# Patient Record
Sex: MALE | Race: WHITE | NOT HISPANIC OR LATINO | Employment: FULL TIME | URBAN - METROPOLITAN AREA
[De-identification: names, ages, dates, MRNs, and addresses within clinical notes are randomized per-mention and may not be internally consistent; named-entity substitution may affect disease eponyms.]

---

## 2017-01-24 DIAGNOSIS — Z00.00 ROUTINE GENERAL MEDICAL EXAMINATION AT A HEALTH CARE FACILITY: Primary | ICD-10-CM

## 2017-01-26 ENCOUNTER — CLINICAL SUPPORT (OUTPATIENT)
Dept: INTERNAL MEDICINE | Facility: CLINIC | Age: 72
End: 2017-01-26
Payer: COMMERCIAL

## 2017-01-26 ENCOUNTER — CLINICAL SUPPORT (OUTPATIENT)
Dept: INFECTIOUS DISEASES | Facility: CLINIC | Age: 72
End: 2017-01-26
Payer: COMMERCIAL

## 2017-01-26 ENCOUNTER — OFFICE VISIT (OUTPATIENT)
Dept: PULMONOLOGY | Facility: CLINIC | Age: 72
End: 2017-01-26
Payer: COMMERCIAL

## 2017-01-26 ENCOUNTER — HOSPITAL ENCOUNTER (OUTPATIENT)
Dept: CARDIOLOGY | Facility: CLINIC | Age: 72
Discharge: HOME | End: 2017-01-26
Payer: COMMERCIAL

## 2017-01-26 VITALS
WEIGHT: 205 LBS | TEMPERATURE: 55 F | BODY MASS INDEX: 27.77 KG/M2 | DIASTOLIC BLOOD PRESSURE: 75 MMHG | HEIGHT: 72 IN | SYSTOLIC BLOOD PRESSURE: 136 MMHG

## 2017-01-26 DIAGNOSIS — Z00.00 ROUTINE GENERAL MEDICAL EXAMINATION AT A HEALTH CARE FACILITY: ICD-10-CM

## 2017-01-26 DIAGNOSIS — Z00.00 ANNUAL PHYSICAL EXAM: Primary | ICD-10-CM

## 2017-01-26 DIAGNOSIS — Z00.00 ROUTINE GENERAL MEDICAL EXAMINATION AT A HEALTH CARE FACILITY: Primary | ICD-10-CM

## 2017-01-26 LAB
ALBUMIN SERPL BCP-MCNC: 3.7 G/DL
ALP SERPL-CCNC: 51 U/L
ALT SERPL W/O P-5'-P-CCNC: 23 U/L
ANION GAP SERPL CALC-SCNC: 5 MMOL/L
AST SERPL-CCNC: 20 U/L
BILIRUB SERPL-MCNC: 1 MG/DL
BUN SERPL-MCNC: 12 MG/DL
CALCIUM SERPL-MCNC: 9.2 MG/DL
CHLORIDE SERPL-SCNC: 107 MMOL/L
CHOLEST/HDLC SERPL: 3.2 {RATIO}
CO2 SERPL-SCNC: 28 MMOL/L
COMPLEXED PSA SERPL-MCNC: 3.3 NG/ML
CREAT SERPL-MCNC: 1.1 MG/DL
ERYTHROCYTE [DISTWIDTH] IN BLOOD BY AUTOMATED COUNT: 13.1 %
EST. GFR  (AFRICAN AMERICAN): >60 ML/MIN/1.73 M^2
EST. GFR  (NON AFRICAN AMERICAN): >60 ML/MIN/1.73 M^2
ESTIMATED AVG GLUCOSE: 108 MG/DL
GLUCOSE SERPL-MCNC: 108 MG/DL
HBA1C MFR BLD HPLC: 5.4 %
HCT VFR BLD AUTO: 46.9 %
HDL/CHOLESTEROL RATIO: 31.1 %
HDLC SERPL-MCNC: 177 MG/DL
HDLC SERPL-MCNC: 55 MG/DL
HGB BLD-MCNC: 15.9 G/DL
LDLC SERPL CALC-MCNC: 106.2 MG/DL
MCH RBC QN AUTO: 31.4 PG
MCHC RBC AUTO-ENTMCNC: 33.9 %
MCV RBC AUTO: 93 FL
NONHDLC SERPL-MCNC: 122 MG/DL
PLATELET # BLD AUTO: 186 K/UL
PMV BLD AUTO: 9.9 FL
POTASSIUM SERPL-SCNC: 4.4 MMOL/L
PROT SERPL-MCNC: 6.5 G/DL
RBC # BLD AUTO: 5.06 M/UL
SODIUM SERPL-SCNC: 140 MMOL/L
TRIGL SERPL-MCNC: 79 MG/DL
TSH SERPL DL<=0.005 MIU/L-ACNC: 2.54 UIU/ML
WBC # BLD AUTO: 5.69 K/UL

## 2017-01-26 PROCEDURE — 93000 ELECTROCARDIOGRAM COMPLETE: CPT | Mod: S$GLB,,, | Performed by: INTERNAL MEDICINE

## 2017-01-26 PROCEDURE — 90471 IMMUNIZATION ADMIN: CPT | Mod: S$GLB,,, | Performed by: INTERNAL MEDICINE

## 2017-01-26 PROCEDURE — 99999 PR PBB SHADOW E&M-EST. PATIENT-LVL I: CPT | Mod: PBBFAC,,,

## 2017-01-26 PROCEDURE — 84443 ASSAY THYROID STIM HORMONE: CPT

## 2017-01-26 PROCEDURE — 99397 PER PM REEVAL EST PAT 65+ YR: CPT | Mod: S$GLB,,, | Performed by: INTERNAL MEDICINE

## 2017-01-26 PROCEDURE — 90715 TDAP VACCINE 7 YRS/> IM: CPT | Mod: S$GLB,,, | Performed by: INTERNAL MEDICINE

## 2017-01-26 PROCEDURE — 84153 ASSAY OF PSA TOTAL: CPT

## 2017-01-26 PROCEDURE — 80053 COMPREHEN METABOLIC PANEL: CPT

## 2017-01-26 PROCEDURE — 85027 COMPLETE CBC AUTOMATED: CPT

## 2017-01-26 PROCEDURE — 83036 HEMOGLOBIN GLYCOSYLATED A1C: CPT

## 2017-01-26 PROCEDURE — 80061 LIPID PANEL: CPT

## 2017-01-26 PROCEDURE — 99999 PR PBB SHADOW E&M-EST. PATIENT-LVL III: CPT | Mod: PBBFAC,,, | Performed by: INTERNAL MEDICINE

## 2017-01-26 NOTE — LETTER
January 26, 2017    Rip Anthony  [address]  [facility] [address]             [facility]  [address]  [facility] [address]  Phone: [phone] Dear Rip,    Thank you for allowing me to serve you and perform your Executive Health exam on 1/26/2017. This letter will serve as a brief summary of the physical findings and laboratory/studies performed and recommendations at this time.  Today's assessment is essentially normal. In Feb. 2013, you weighed 220 pounds, good job with the weight loss.     Enjoy the [employer] [place] in [place].         If you have any questions or concerns, please don't hesitate to call.    Sincerely,        Davide Parker MD
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
## 2017-05-04 ENCOUNTER — OFFICE VISIT (OUTPATIENT)
Dept: INTERNAL MEDICINE | Facility: CLINIC | Age: 72
End: 2017-05-04
Payer: COMMERCIAL

## 2017-05-04 ENCOUNTER — PATIENT MESSAGE (OUTPATIENT)
Dept: INTERNAL MEDICINE | Facility: CLINIC | Age: 72
End: 2017-05-04

## 2017-05-04 ENCOUNTER — TELEPHONE (OUTPATIENT)
Dept: INTERNAL MEDICINE | Facility: CLINIC | Age: 72
End: 2017-05-04

## 2017-05-04 ENCOUNTER — HOSPITAL ENCOUNTER (OUTPATIENT)
Dept: RADIOLOGY | Facility: HOSPITAL | Age: 72
Discharge: HOME | End: 2017-05-04
Attending: INTERNAL MEDICINE
Payer: COMMERCIAL

## 2017-05-04 VITALS
SYSTOLIC BLOOD PRESSURE: 130 MMHG | DIASTOLIC BLOOD PRESSURE: 75 MMHG | HEART RATE: 68 BPM | BODY MASS INDEX: 28.37 KG/M2 | WEIGHT: 214.06 LBS | HEIGHT: 73 IN

## 2017-05-04 DIAGNOSIS — S13.4XXA WHIPLASH INJURY, INITIAL ENCOUNTER: ICD-10-CM

## 2017-05-04 DIAGNOSIS — M54.6 PAIN IN THORACIC SPINE AT MULTIPLE SITES: ICD-10-CM

## 2017-05-04 DIAGNOSIS — E78.2 MIXED HYPERLIPIDEMIA: ICD-10-CM

## 2017-05-04 DIAGNOSIS — R07.89 CHEST WALL PAIN: Primary | ICD-10-CM

## 2017-05-04 DIAGNOSIS — R07.89 CHEST WALL PAIN: ICD-10-CM

## 2017-05-04 PROCEDURE — 93010 ELECTROCARDIOGRAM REPORT: CPT | Mod: S$GLB,,, | Performed by: INTERNAL MEDICINE

## 2017-05-04 PROCEDURE — 72040 X-RAY EXAM NECK SPINE 2-3 VW: CPT | Mod: TC

## 2017-05-04 PROCEDURE — 71020 XR CHEST PA AND LATERAL: CPT | Mod: 26,,, | Performed by: RADIOLOGY

## 2017-05-04 PROCEDURE — 71020 XR CHEST PA AND LATERAL: CPT | Mod: TC

## 2017-05-04 PROCEDURE — 93005 ELECTROCARDIOGRAM TRACING: CPT | Mod: S$GLB,,, | Performed by: INTERNAL MEDICINE

## 2017-05-04 PROCEDURE — 72070 X-RAY EXAM THORAC SPINE 2VWS: CPT | Mod: TC

## 2017-05-04 PROCEDURE — 71110 X-RAY EXAM RIBS BIL 3 VIEWS: CPT | Mod: TC

## 2017-05-04 PROCEDURE — 99214 OFFICE O/P EST MOD 30 MIN: CPT | Mod: 25 | Performed by: INTERNAL MEDICINE

## 2017-05-04 PROCEDURE — 72070 X-RAY EXAM THORAC SPINE 2VWS: CPT | Mod: 26,,, | Performed by: RADIOLOGY

## 2017-05-04 PROCEDURE — 72040 X-RAY EXAM NECK SPINE 2-3 VW: CPT | Mod: 26,,, | Performed by: RADIOLOGY

## 2017-05-04 PROCEDURE — 71110 X-RAY EXAM RIBS BIL 3 VIEWS: CPT | Mod: 26,,, | Performed by: RADIOLOGY

## 2017-05-04 PROCEDURE — 99204 OFFICE O/P NEW MOD 45 MIN: CPT | Mod: S$GLB,,, | Performed by: INTERNAL MEDICINE
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
## 2017-10-18 RX ORDER — ATORVASTATIN CALCIUM 20 MG/1
TABLET, FILM COATED ORAL
Qty: 30 TABLET | Refills: 12 | Status: SHIPPED | OUTPATIENT
Start: 2017-10-18 | End: 2018-12-10 | Stop reason: SDUPTHER

## 2017-12-06 DIAGNOSIS — Z00.00 ROUTINE GENERAL MEDICAL EXAMINATION AT A HEALTH CARE FACILITY: Primary | ICD-10-CM

## 2018-05-01 DIAGNOSIS — Z00.00 ROUTINE GENERAL MEDICAL EXAMINATION AT A HEALTH CARE FACILITY: Primary | ICD-10-CM

## 2018-05-02 ENCOUNTER — CLINICAL SUPPORT (OUTPATIENT)
Dept: INTERNAL MEDICINE | Facility: CLINIC | Age: 73
End: 2018-05-02
Payer: COMMERCIAL

## 2018-05-02 ENCOUNTER — OFFICE VISIT (OUTPATIENT)
Dept: PULMONOLOGY | Facility: CLINIC | Age: 73
End: 2018-05-02
Payer: COMMERCIAL

## 2018-05-02 ENCOUNTER — HOSPITAL ENCOUNTER (OUTPATIENT)
Dept: CARDIOLOGY | Facility: CLINIC | Age: 73
Discharge: HOME | End: 2018-05-02
Payer: COMMERCIAL

## 2018-05-02 VITALS
HEART RATE: 50 BPM | SYSTOLIC BLOOD PRESSURE: 126 MMHG | DIASTOLIC BLOOD PRESSURE: 75 MMHG | BODY MASS INDEX: 26.51 KG/M2 | HEIGHT: 73 IN | RESPIRATION RATE: 12 BRPM | WEIGHT: 200 LBS

## 2018-05-02 DIAGNOSIS — Z00.00 ANNUAL PHYSICAL EXAM: Primary | ICD-10-CM

## 2018-05-02 DIAGNOSIS — Z00.00 ROUTINE GENERAL MEDICAL EXAMINATION AT A HEALTH CARE FACILITY: ICD-10-CM

## 2018-05-02 DIAGNOSIS — Z00.00 ROUTINE GENERAL MEDICAL EXAMINATION AT A HEALTH CARE FACILITY: Primary | ICD-10-CM

## 2018-05-02 LAB
ALBUMIN SERPL BCP-MCNC: 3.7 G/DL
ALP SERPL-CCNC: 42 U/L
ALT SERPL W/O P-5'-P-CCNC: 16 U/L
ANION GAP SERPL CALC-SCNC: 6 MMOL/L
AST SERPL-CCNC: 16 U/L
BILIRUB SERPL-MCNC: 1.1 MG/DL
BUN SERPL-MCNC: 14 MG/DL
CALCIUM SERPL-MCNC: 9.5 MG/DL
CHLORIDE SERPL-SCNC: 107 MMOL/L
CHOLEST SERPL-MCNC: 157 MG/DL
CHOLEST/HDLC SERPL: 3.2 {RATIO}
CO2 SERPL-SCNC: 27 MMOL/L
COMPLEXED PSA SERPL-MCNC: 3.6 NG/ML
CREAT SERPL-MCNC: 0.9 MG/DL
ERYTHROCYTE [DISTWIDTH] IN BLOOD BY AUTOMATED COUNT: 12.6 %
EST. GFR  (AFRICAN AMERICAN): >60 ML/MIN/1.73 M^2
EST. GFR  (NON AFRICAN AMERICAN): >60 ML/MIN/1.73 M^2
ESTIMATED AVG GLUCOSE: 100 MG/DL
GLUCOSE SERPL-MCNC: 105 MG/DL
HBA1C MFR BLD HPLC: 5.1 %
HCT VFR BLD AUTO: 45.4 %
HDLC SERPL-MCNC: 49 MG/DL
HDLC SERPL: 31.2 %
HGB BLD-MCNC: 15.2 G/DL
LDLC SERPL CALC-MCNC: 92.4 MG/DL
MCH RBC QN AUTO: 31.2 PG
MCHC RBC AUTO-ENTMCNC: 33.5 G/DL
MCV RBC AUTO: 93 FL
NONHDLC SERPL-MCNC: 108 MG/DL
PLATELET # BLD AUTO: 209 K/UL
PMV BLD AUTO: 10.1 FL
POTASSIUM SERPL-SCNC: 4.5 MMOL/L
PROT SERPL-MCNC: 6.5 G/DL
RBC # BLD AUTO: 4.87 M/UL
SODIUM SERPL-SCNC: 140 MMOL/L
TRIGL SERPL-MCNC: 78 MG/DL
TSH SERPL DL<=0.005 MIU/L-ACNC: 2.65 UIU/ML
WBC # BLD AUTO: 6.05 K/UL

## 2018-05-02 PROCEDURE — 80061 LIPID PANEL: CPT

## 2018-05-02 PROCEDURE — 80053 COMPREHEN METABOLIC PANEL: CPT

## 2018-05-02 PROCEDURE — 93000 ELECTROCARDIOGRAM COMPLETE: CPT | Mod: S$GLB,,, | Performed by: INTERNAL MEDICINE

## 2018-05-02 PROCEDURE — 99397 PER PM REEVAL EST PAT 65+ YR: CPT | Mod: S$GLB,,, | Performed by: INTERNAL MEDICINE

## 2018-05-02 PROCEDURE — 84443 ASSAY THYROID STIM HORMONE: CPT

## 2018-05-02 PROCEDURE — 84153 ASSAY OF PSA TOTAL: CPT

## 2018-05-02 PROCEDURE — 99999 PR PBB SHADOW E&M-EST. PATIENT-LVL III: CPT | Mod: PBBFAC,,, | Performed by: INTERNAL MEDICINE

## 2018-05-02 PROCEDURE — 85027 COMPLETE CBC AUTOMATED: CPT

## 2018-05-02 PROCEDURE — 83036 HEMOGLOBIN GLYCOSYLATED A1C: CPT

## 2018-05-02 NOTE — PROGRESS NOTES
Subjective:       Patient ID: Rip Anthony is a 72 y.o. male.    Chief Complaint: Annual Exam    HPI 71 yo  comes for his periodic health exam. He feels well,exercises about 5 times a week, martha 30 minutes of aerobic exercise at each sessions. Is followed every 3 months locally from his glaucoma which is doing well. He has no active medical complaints today.Has dropped about 20 pounds over the past several years. And has a BMI of 27.  Review of Systems   Constitutional: Negative.    HENT: Negative.    Eyes: Negative.         Glaucoma    Respiratory: Negative.    Cardiovascular: Negative.    Gastrointestinal: Negative.    Genitourinary: Negative.    Musculoskeletal: Negative.    Skin: Negative.    Neurological: Negative.    Psychiatric/Behavioral: Negative.    All other systems reviewed and are negative.      Objective:      Physical Exam   Constitutional: He is oriented to person, place, and time. He appears well-developed and well-nourished.   HENT:   Head: Normocephalic and atraumatic.   Right Ear: External ear normal.   Left Ear: External ear normal.   Eyes: Conjunctivae and EOM are normal. Pupils are equal, round, and reactive to light.   Neck: Normal range of motion. Neck supple.   Cardiovascular: Normal rate, regular rhythm and normal heart sounds.    Pulmonary/Chest: Effort normal and breath sounds normal.   Peak flow 600 l/min   Abdominal: Soft. Bowel sounds are normal.   Musculoskeletal: Normal range of motion.   Neurological: He is alert and oriented to person, place, and time. He has normal reflexes.   Skin: Skin is warm and dry.   Psychiatric: He has a normal mood and affect. His behavior is normal. Judgment and thought content normal.       Assessment:       No diagnosis found.    Plan:       EKG is normal with bradycardia consistent with a good fitness level. Labs: All parameters are normal and essentially unchanged from 2017 visit. IMP: Healthy Male with good health habits.

## 2018-05-02 NOTE — LETTER
May 2, 2018    Rip Kristenyard  [address]  [facility] [address]             [facility] - Pul[facility] Services  [address]  [facility] [address]  Phone: [phone] Dear Rip      Thank you for allowing me to serve you and perform your Executive Health exam on 5/2/2018. This letter will serve as a brief summary of the physical findings and laboratory/studies performed and recommendations at this time. Today's assessment is essentially normal. Your work up routine is paying off in spades. You are the healthiest male in the [address] [facility] [place].  Thanks for looking after Meche after her accident.        If you have any questions or concerns, please don't hesitate to call.    Sincerely,        Davide Parker MD

## 2018-11-28 ENCOUNTER — OFFICE VISIT (OUTPATIENT)
Dept: PULMONOLOGY | Facility: CLINIC | Age: 73
End: 2018-11-28
Payer: COMMERCIAL

## 2018-11-28 VITALS
DIASTOLIC BLOOD PRESSURE: 72 MMHG | HEART RATE: 63 BPM | HEIGHT: 73 IN | SYSTOLIC BLOOD PRESSURE: 134 MMHG | OXYGEN SATURATION: 99 % | WEIGHT: 199.94 LBS | BODY MASS INDEX: 26.5 KG/M2

## 2018-11-28 DIAGNOSIS — J06.9 URI, ACUTE: Primary | ICD-10-CM

## 2018-11-28 PROCEDURE — 99214 OFFICE O/P EST MOD 30 MIN: CPT | Mod: S$GLB,,, | Performed by: INTERNAL MEDICINE

## 2018-11-28 PROCEDURE — 99999 PR PBB SHADOW E&M-EST. PATIENT-LVL III: CPT | Mod: PBBFAC,,, | Performed by: INTERNAL MEDICINE

## 2018-11-28 PROCEDURE — 1101F PT FALLS ASSESS-DOCD LE1/YR: CPT | Mod: CPTII,S$GLB,, | Performed by: INTERNAL MEDICINE

## 2018-11-28 RX ORDER — AZITHROMYCIN 250 MG/1
TABLET, FILM COATED ORAL
Qty: 6 TABLET | Refills: 1 | Status: SHIPPED | OUTPATIENT
Start: 2018-11-28 | End: 2020-06-02

## 2018-11-28 NOTE — PROGRESS NOTES
Subjective:      Patient ID: Rip Anthony is a 73 y.o. male.    Chief Complaint: Nasal Congestion and Cough    HPI73 yo  comes because he had low grade fever and malaise that began several days ago. He is feeling better but still not up to par. He recently went to [place] and subsequently had a lumbar surgery for a pinched nerve by Dr. Posadas and got excellent results.       No flowsheet data found.  Review of Systems   Constitutional: Negative.    HENT: Positive for postnasal drip, rhinorrhea and sore throat.    Eyes: Negative.    Respiratory: Negative.         Hx of  Right sided pneumonia in the past.    Cardiovascular: Negative.    Genitourinary: Negative.    Musculoskeletal: Negative.         Recent lumbar surgery for pinched nerve and sciatica with good results.    Skin: Negative.    Gastrointestinal: Negative.    Neurological: Negative.    Psychiatric/Behavioral: Negative.      Objective:     Physical Exam   Constitutional: He is oriented to person, place, and time. He appears well-developed and well-nourished.   HENT:   Head: Normocephalic and atraumatic.   Right Ear: External ear normal.   Left Ear: External ear normal.   Clear  No erythema or drainage   Eyes: Conjunctivae and EOM are normal. Pupils are equal, round, and reactive to light.   Neck: Normal range of motion. Neck supple.   Cardiovascular: Normal rate, regular rhythm and normal heart sounds.   Pulmonary/Chest: Effort normal and breath sounds normal.   Clear without wheezes or rales.   Abdominal: Soft. Bowel sounds are normal.   Musculoskeletal: Normal range of motion.   Healing lumbar surgery scar, Clean and no erythema   Neurological: He is alert and oriented to person, place, and time. He has normal reflexes.   Skin: Skin is warm and dry.   Psychiatric: He has a normal mood and affect. His behavior is normal. Judgment and thought content normal.       Assessment:     1. URI, acute      Outpatient Encounter Medications as of  11/28/2018   Medication Sig Dispense Refill    atorvastatin (LIPITOR) 20 MG tablet TAKE ONE TABLET BY MOUTH AT BEDTIME 30 tablet 12    azithromycin (ZITHROMAX Z-GRISELDA) 250 MG tablet Two tablets initially, then one tablet daily 6 tablet 1    brimonidine 0.1% (ALPHAGAN P) 0.1 % Drop Inject into the eye.      dorzolamide-timolol 2-0.5% (COSOPT) 2-0.5 % ophthalmic solution Inject into the eye.      [DISCONTINUED] zolpidem (AMBIEN) 10 mg Tab Take 1 tablet (10 mg total) by mouth nightly as needed. 30 tablet 3     No facility-administered encounter medications on file as of 11/28/2018.        Plan:   Probably resolving viral URI but will give him a zpak  Problem List Items Addressed This Visit     None      Visit Diagnoses     URI, acute    -  Primary

## 2018-12-11 RX ORDER — ATORVASTATIN CALCIUM 20 MG/1
TABLET, FILM COATED ORAL
Qty: 30 TABLET | Refills: 12 | Status: SHIPPED | OUTPATIENT
Start: 2018-12-11 | End: 2020-01-07

## 2019-03-11 DIAGNOSIS — Z00.00 ROUTINE GENERAL MEDICAL EXAMINATION AT A HEALTH CARE FACILITY: Primary | ICD-10-CM

## 2019-03-13 DIAGNOSIS — Z12.11 SPECIAL SCREENING FOR MALIGNANT NEOPLASMS, COLON: Primary | ICD-10-CM

## 2019-04-16 ENCOUNTER — TELEPHONE (OUTPATIENT)
Dept: ENDOSCOPY | Facility: HOSPITAL | Age: 74
End: 2019-04-16

## 2019-04-16 ENCOUNTER — PATIENT MESSAGE (OUTPATIENT)
Dept: PULMONOLOGY | Facility: CLINIC | Age: 74
End: 2019-04-16

## 2019-04-16 DIAGNOSIS — Z12.11 SPECIAL SCREENING FOR MALIGNANT NEOPLASMS, COLON: Primary | ICD-10-CM

## 2019-04-16 RX ORDER — SODIUM, POTASSIUM,MAG SULFATES 17.5-3.13G
SOLUTION, RECONSTITUTED, ORAL ORAL
Qty: 1 BOTTLE | Refills: 0 | Status: SHIPPED | OUTPATIENT
Start: 2019-04-16 | End: 2020-06-02

## 2019-04-17 ENCOUNTER — TELEPHONE (OUTPATIENT)
Dept: ENDOSCOPY | Facility: HOSPITAL | Age: 74
End: 2019-04-17

## 2019-05-30 ENCOUNTER — CLINICAL SUPPORT (OUTPATIENT)
Dept: INTERNAL MEDICINE | Facility: CLINIC | Age: 74
End: 2019-05-30
Payer: COMMERCIAL

## 2019-05-30 ENCOUNTER — OFFICE VISIT (OUTPATIENT)
Dept: PULMONOLOGY | Facility: CLINIC | Age: 74
End: 2019-05-30
Payer: COMMERCIAL

## 2019-05-30 ENCOUNTER — HOSPITAL ENCOUNTER (OUTPATIENT)
Dept: CARDIOLOGY | Facility: CLINIC | Age: 74
Discharge: HOME | End: 2019-05-30
Payer: COMMERCIAL

## 2019-05-30 VITALS
WEIGHT: 200.88 LBS | SYSTOLIC BLOOD PRESSURE: 132 MMHG | DIASTOLIC BLOOD PRESSURE: 76 MMHG | HEART RATE: 61 BPM | BODY MASS INDEX: 26.62 KG/M2 | HEIGHT: 73 IN

## 2019-05-30 DIAGNOSIS — Z00.00 ANNUAL PHYSICAL EXAM: Primary | ICD-10-CM

## 2019-05-30 DIAGNOSIS — Z00.00 ROUTINE GENERAL MEDICAL EXAMINATION AT A HEALTH CARE FACILITY: ICD-10-CM

## 2019-05-30 LAB
ALBUMIN SERPL BCP-MCNC: 3.7 G/DL (ref 3.5–5.2)
ALP SERPL-CCNC: 65 U/L (ref 55–135)
ALT SERPL W/O P-5'-P-CCNC: 16 U/L (ref 10–44)
ANION GAP SERPL CALC-SCNC: 6 MMOL/L (ref 8–16)
AST SERPL-CCNC: 20 U/L (ref 10–40)
BILIRUB SERPL-MCNC: 1 MG/DL (ref 0.1–1)
BUN SERPL-MCNC: 8 MG/DL (ref 8–23)
CALCIUM SERPL-MCNC: 9.9 MG/DL (ref 8.7–10.5)
CHLORIDE SERPL-SCNC: 108 MMOL/L (ref 95–110)
CHOLEST SERPL-MCNC: 166 MG/DL (ref 120–199)
CHOLEST/HDLC SERPL: 3.3 {RATIO} (ref 2–5)
CO2 SERPL-SCNC: 26 MMOL/L (ref 23–29)
COMPLEXED PSA SERPL-MCNC: 3.5 NG/ML (ref 0–4)
CREAT SERPL-MCNC: 0.9 MG/DL (ref 0.5–1.4)
ERYTHROCYTE [DISTWIDTH] IN BLOOD BY AUTOMATED COUNT: 12.8 % (ref 11.5–14.5)
EST. GFR  (AFRICAN AMERICAN): >60 ML/MIN/1.73 M^2
EST. GFR  (NON AFRICAN AMERICAN): >60 ML/MIN/1.73 M^2
ESTIMATED AVG GLUCOSE: 103 MG/DL (ref 68–131)
GLUCOSE SERPL-MCNC: 104 MG/DL (ref 70–110)
HBA1C MFR BLD HPLC: 5.2 % (ref 4–5.6)
HCT VFR BLD AUTO: 45.7 % (ref 40–54)
HDLC SERPL-MCNC: 51 MG/DL (ref 40–75)
HDLC SERPL: 30.7 % (ref 20–50)
HGB BLD-MCNC: 15.5 G/DL (ref 14–18)
LDLC SERPL CALC-MCNC: 98 MG/DL (ref 63–159)
MCH RBC QN AUTO: 31.6 PG (ref 27–31)
MCHC RBC AUTO-ENTMCNC: 33.9 G/DL (ref 32–36)
MCV RBC AUTO: 93 FL (ref 82–98)
NONHDLC SERPL-MCNC: 115 MG/DL
PLATELET # BLD AUTO: 175 K/UL (ref 150–350)
PMV BLD AUTO: 9.8 FL (ref 9.2–12.9)
POTASSIUM SERPL-SCNC: 4 MMOL/L (ref 3.5–5.1)
PROT SERPL-MCNC: 6.6 G/DL (ref 6–8.4)
RBC # BLD AUTO: 4.91 M/UL (ref 4.6–6.2)
SODIUM SERPL-SCNC: 140 MMOL/L (ref 136–145)
TRIGL SERPL-MCNC: 85 MG/DL (ref 30–150)
TSH SERPL DL<=0.005 MIU/L-ACNC: 2.2 UIU/ML (ref 0.4–4)
WBC # BLD AUTO: 7.32 K/UL (ref 3.9–12.7)

## 2019-05-30 PROCEDURE — 99397 PER PM REEVAL EST PAT 65+ YR: CPT | Mod: S$GLB,,, | Performed by: INTERNAL MEDICINE

## 2019-05-30 PROCEDURE — 99397 PR PREVENTIVE VISIT,EST,65 & OVER: ICD-10-PCS | Mod: S$GLB,,, | Performed by: INTERNAL MEDICINE

## 2019-05-30 PROCEDURE — 93010 EKG 12-LEAD: ICD-10-PCS | Mod: S$GLB,,, | Performed by: INTERNAL MEDICINE

## 2019-05-30 PROCEDURE — 84443 ASSAY THYROID STIM HORMONE: CPT

## 2019-05-30 PROCEDURE — 85027 COMPLETE CBC AUTOMATED: CPT

## 2019-05-30 PROCEDURE — 99999 PR PBB SHADOW E&M-EST. PATIENT-LVL II: CPT | Mod: PBBFAC,,, | Performed by: INTERNAL MEDICINE

## 2019-05-30 PROCEDURE — 84153 ASSAY OF PSA TOTAL: CPT

## 2019-05-30 PROCEDURE — 80053 COMPREHEN METABOLIC PANEL: CPT

## 2019-05-30 PROCEDURE — 80061 LIPID PANEL: CPT

## 2019-05-30 PROCEDURE — 93005 EKG 12-LEAD: ICD-10-PCS | Mod: S$GLB,,, | Performed by: INTERNAL MEDICINE

## 2019-05-30 PROCEDURE — 93010 ELECTROCARDIOGRAM REPORT: CPT | Mod: S$GLB,,, | Performed by: INTERNAL MEDICINE

## 2019-05-30 PROCEDURE — 99999 PR PBB SHADOW E&M-EST. PATIENT-LVL II: ICD-10-PCS | Mod: PBBFAC,,, | Performed by: INTERNAL MEDICINE

## 2019-05-30 PROCEDURE — 83036 HEMOGLOBIN GLYCOSYLATED A1C: CPT

## 2019-05-30 PROCEDURE — 93005 ELECTROCARDIOGRAM TRACING: CPT | Mod: S$GLB,,, | Performed by: INTERNAL MEDICINE

## 2019-05-30 RX ORDER — BRIMONIDINE TARTRATE 1 MG/ML
SOLUTION/ DROPS OPHTHALMIC
COMMUNITY
End: 2021-06-24

## 2019-05-30 RX ORDER — ASPIRIN 81 MG/1
TABLET ORAL
COMMUNITY
Start: 2011-12-14

## 2019-05-30 RX ORDER — DORZOLAMIDE HYDROCHLORIDE AND TIMOLOL MALEATE 20; 5 MG/ML; MG/ML
SOLUTION/ DROPS OPHTHALMIC
COMMUNITY
End: 2021-06-24

## 2019-05-30 RX ORDER — DORZOLAMIDE HYDROCHLORIDE AND TIMOLOL MALEATE 20; 5 MG/ML; MG/ML
1 SOLUTION/ DROPS OPHTHALMIC 2 TIMES DAILY
Refills: 11 | COMMUNITY
Start: 2019-05-28

## 2019-05-30 NOTE — PROGRESS NOTES
Subjective:       Patient ID: Rpi Anthony is a 73 y.o. male.    Chief Complaint: No chief complaint on file.    HPI   74 yo  comes for his periodic health exam. He feels well, had a mini discectomy by Dr. Posadas this fall with excellent results. No further problems. He exercises under the supervision of a  at the [facility] He has chronic glaucoma and takes drops. He will be going to [place] this summer for his annual follow up. No medical complaints.   Review of Systems   Constitutional: Negative.    HENT: Negative.    Eyes: Positive for visual disturbance.        Glaucoma   Respiratory: Negative.    Cardiovascular: Negative.    Gastrointestinal: Negative.         Scheduled for a colonoscope later this week.   Genitourinary: Negative.    Musculoskeletal: Negative.         S/P Lumbar mini discectomy for acute sciatica.   Skin: Negative.    Neurological: Negative.    Psychiatric/Behavioral: Negative.    All other systems reviewed and are negative.      Objective:      Physical Exam   Constitutional: He is oriented to person, place, and time. He appears well-developed and well-nourished.   HENT:   Head: Normocephalic and atraumatic.   Right Ear: External ear normal.   Left Ear: External ear normal.   Eyes: Pupils are equal, round, and reactive to light. Conjunctivae and EOM are normal.   Neck: Normal range of motion. Neck supple.   Cardiovascular: Normal rate, regular rhythm and normal heart sounds.   Pulmonary/Chest: Effort normal and breath sounds normal.   Peak flow 400 l/min   Abdominal: Soft. Bowel sounds are normal.   Musculoskeletal: Normal range of motion.   Neurological: He is alert and oriented to person, place, and time. He has normal reflexes.   Skin: Skin is warm and dry.   Psychiatric: He has a normal mood and affect. His behavior is normal. Judgment and thought content normal.       Assessment:       1. Annual physical exam        Plan:       EKG: Sinus bradycardia and normal. Labs:  All parameters are normal and essentially unchanged from May 2018. IMP: Healthy Male with good health habits.

## 2019-05-30 NOTE — LETTER
May 30, 2019    Rip Petersonjahairakezia  [address] [place] [address]             [facility] - Pul[facility] Services  [address]  [facility] [address]  Phone: [phone] Dear Rip,         Thank you for allowing me to serve you and perform your Executive Health exam on 5/30/2019. This letter will serve as a brief summary of the physical findings and laboratory/studies performed and recommendations at this time. Today's assessment is essentially normal. You might be the healthiest cassidy on [facility].           If you have any questions or concerns, please don't hesitate to call.    Sincerely,        Davide Parker MD

## 2019-05-30 NOTE — H&P (VIEW-ONLY)
Subjective:       Patient ID: Rip Anthony is a 73 y.o. male.    Chief Complaint: No chief complaint on file.    HPI   74 yo  comes for his periodic health exam. He feels well, had a mini discectomy by Dr. Posadas this fall with excellent results. No further problems. He exercises under the supervision of a  at the [facility] He has chronic glaucoma and takes drops. He will be going to [place] this summer for his annual follow up. No medical complaints.   Review of Systems   Constitutional: Negative.    HENT: Negative.    Eyes: Positive for visual disturbance.        Glaucoma   Respiratory: Negative.    Cardiovascular: Negative.    Gastrointestinal: Negative.         Scheduled for a colonoscope later this week.   Genitourinary: Negative.    Musculoskeletal: Negative.         S/P Lumbar mini discectomy for acute sciatica.   Skin: Negative.    Neurological: Negative.    Psychiatric/Behavioral: Negative.    All other systems reviewed and are negative.      Objective:      Physical Exam   Constitutional: He is oriented to person, place, and time. He appears well-developed and well-nourished.   HENT:   Head: Normocephalic and atraumatic.   Right Ear: External ear normal.   Left Ear: External ear normal.   Eyes: Pupils are equal, round, and reactive to light. Conjunctivae and EOM are normal.   Neck: Normal range of motion. Neck supple.   Cardiovascular: Normal rate, regular rhythm and normal heart sounds.   Pulmonary/Chest: Effort normal and breath sounds normal.   Peak flow 400 l/min   Abdominal: Soft. Bowel sounds are normal.   Musculoskeletal: Normal range of motion.   Neurological: He is alert and oriented to person, place, and time. He has normal reflexes.   Skin: Skin is warm and dry.   Psychiatric: He has a normal mood and affect. His behavior is normal. Judgment and thought content normal.       Assessment:       1. Annual physical exam        Plan:       EKG: Sinus bradycardia and normal. Labs:  All parameters are normal and essentially unchanged from May 2018. IMP: Healthy Male with good health habits.

## 2019-06-04 ENCOUNTER — ANESTHESIA EVENT (OUTPATIENT)
Dept: ENDOSCOPY | Facility: HOSPITAL | Age: 74
End: 2019-06-04
Payer: COMMERCIAL

## 2019-06-04 ENCOUNTER — ANESTHESIA (OUTPATIENT)
Dept: ENDOSCOPY | Facility: HOSPITAL | Age: 74
End: 2019-06-04
Payer: COMMERCIAL

## 2019-06-04 ENCOUNTER — HOSPITAL ENCOUNTER (OUTPATIENT)
Facility: HOSPITAL | Age: 74
Discharge: HOME | End: 2019-06-04
Attending: INTERNAL MEDICINE | Admitting: INTERNAL MEDICINE
Payer: COMMERCIAL

## 2019-06-04 VITALS
SYSTOLIC BLOOD PRESSURE: 129 MMHG | WEIGHT: 200 LBS | DIASTOLIC BLOOD PRESSURE: 60 MMHG | RESPIRATION RATE: 18 BRPM | TEMPERATURE: 98 F | HEIGHT: 73 IN | OXYGEN SATURATION: 100 % | BODY MASS INDEX: 26.51 KG/M2 | HEART RATE: 45 BPM

## 2019-06-04 DIAGNOSIS — Z86.010 HISTORY OF COLON POLYPS: Primary | ICD-10-CM

## 2019-06-04 PROBLEM — Z86.0100 HISTORY OF COLON POLYPS: Status: ACTIVE | Noted: 2019-06-04

## 2019-06-04 PROCEDURE — 27201012 HC FORCEPS, HOT/COLD, DISP: Performed by: INTERNAL MEDICINE

## 2019-06-04 PROCEDURE — 37000009 HC ANESTHESIA EA ADD 15 MINS: Performed by: INTERNAL MEDICINE

## 2019-06-04 PROCEDURE — 88305 TISSUE EXAM BY PATHOLOGIST: CPT | Mod: 26,,, | Performed by: PATHOLOGY

## 2019-06-04 PROCEDURE — 45380 PR COLONOSCOPY,BIOPSY: ICD-10-PCS | Mod: 59,,, | Performed by: INTERNAL MEDICINE

## 2019-06-04 PROCEDURE — 37000008 HC ANESTHESIA 1ST 15 MINUTES: Performed by: INTERNAL MEDICINE

## 2019-06-04 PROCEDURE — 27201089 HC SNARE, DISP (ANY): Performed by: INTERNAL MEDICINE

## 2019-06-04 PROCEDURE — E9220 PRA ENDO ANESTHESIA: ICD-10-PCS | Mod: 33,,, | Performed by: NURSE ANESTHETIST, CERTIFIED REGISTERED

## 2019-06-04 PROCEDURE — 45385 COLONOSCOPY W/LESION REMOVAL: CPT | Mod: 33,,, | Performed by: INTERNAL MEDICINE

## 2019-06-04 PROCEDURE — E9220 PRA ENDO ANESTHESIA: HCPCS | Mod: 33,,, | Performed by: NURSE ANESTHETIST, CERTIFIED REGISTERED

## 2019-06-04 PROCEDURE — 88305 TISSUE SPECIMEN TO PATHOLOGY - SURGERY: ICD-10-PCS | Mod: 26,,, | Performed by: PATHOLOGY

## 2019-06-04 PROCEDURE — 45385 COLONOSCOPY W/LESION REMOVAL: CPT | Performed by: INTERNAL MEDICINE

## 2019-06-04 PROCEDURE — 45380 COLONOSCOPY AND BIOPSY: CPT | Mod: 59,,, | Performed by: INTERNAL MEDICINE

## 2019-06-04 PROCEDURE — 25000003 PHARM REV CODE 250: Performed by: INTERNAL MEDICINE

## 2019-06-04 PROCEDURE — 45380 COLONOSCOPY AND BIOPSY: CPT | Performed by: INTERNAL MEDICINE

## 2019-06-04 PROCEDURE — 63600175 PHARM REV CODE 636 W HCPCS: Performed by: NURSE ANESTHETIST, CERTIFIED REGISTERED

## 2019-06-04 PROCEDURE — 88305 TISSUE EXAM BY PATHOLOGIST: CPT | Performed by: PATHOLOGY

## 2019-06-04 PROCEDURE — 45385 PR COLONOSCOPY,REMV LESN,SNARE: ICD-10-PCS | Mod: 33,,, | Performed by: INTERNAL MEDICINE

## 2019-06-04 RX ORDER — SODIUM CHLORIDE 0.9 % (FLUSH) 0.9 %
10 SYRINGE (ML) INJECTION
Status: DISCONTINUED | OUTPATIENT
Start: 2019-06-04 | End: 2019-06-04 | Stop reason: HOSPADM

## 2019-06-04 RX ORDER — PROPOFOL 10 MG/ML
VIAL (ML) INTRAVENOUS CONTINUOUS PRN
Status: DISCONTINUED | OUTPATIENT
Start: 2019-06-04 | End: 2019-06-04

## 2019-06-04 RX ORDER — SODIUM CHLORIDE 9 MG/ML
INJECTION, SOLUTION INTRAVENOUS CONTINUOUS
Status: DISCONTINUED | OUTPATIENT
Start: 2019-06-04 | End: 2019-06-04 | Stop reason: HOSPADM

## 2019-06-04 RX ORDER — FENTANYL CITRATE 50 UG/ML
25 INJECTION, SOLUTION INTRAMUSCULAR; INTRAVENOUS EVERY 5 MIN PRN
Status: CANCELLED | OUTPATIENT
Start: 2019-06-04

## 2019-06-04 RX ORDER — OXYCODONE HYDROCHLORIDE 5 MG/1
5 TABLET ORAL
Status: CANCELLED | OUTPATIENT
Start: 2019-06-04

## 2019-06-04 RX ORDER — PROPOFOL 10 MG/ML
VIAL (ML) INTRAVENOUS
Status: DISCONTINUED | OUTPATIENT
Start: 2019-06-04 | End: 2019-06-04

## 2019-06-04 RX ORDER — LIDOCAINE HCL/PF 100 MG/5ML
SYRINGE (ML) INTRAVENOUS
Status: DISCONTINUED | OUTPATIENT
Start: 2019-06-04 | End: 2019-06-04

## 2019-06-04 RX ADMIN — PROPOFOL 100 MG: 10 INJECTION, EMULSION INTRAVENOUS at 08:06

## 2019-06-04 RX ADMIN — PROPOFOL 150 MCG/KG/MIN: 10 INJECTION, EMULSION INTRAVENOUS at 08:06

## 2019-06-04 RX ADMIN — SODIUM CHLORIDE: 0.9 INJECTION, SOLUTION INTRAVENOUS at 08:06

## 2019-06-04 RX ADMIN — LIDOCAINE HYDROCHLORIDE 50 MG: 20 INJECTION, SOLUTION INTRAVENOUS at 08:06

## 2019-06-04 NOTE — TRANSFER OF CARE
"Anesthesia Transfer of Care Note    Patient: Rip Anthony    Procedure(s) Performed: Procedure(s) (LRB):  COLONOSCOPY (N/A)    Patient location: PACU    Anesthesia Type: general    Transport from OR: Transported from OR on room air with adequate spontaneous ventilation    Post pain: adequate analgesia    Post assessment: no apparent anesthetic complications and tolerated procedure well    Post vital signs: stable    Level of consciousness: awake    Nausea/Vomiting: no nausea/vomiting    Complications: none    Transfer of care protocol was followed      Last vitals:   Visit Vitals  BP (!) 154/64 (BP Location: Left arm, Patient Position: Lying)   Pulse (!) 55   Temp 36.5 °C (97.7 °F) (Temporal)   Resp 16   Ht 6' 1" (1.854 m)   Wt 90.7 kg (200 lb)   SpO2 97%   BMI 26.39 kg/m²     "

## 2019-06-04 NOTE — DISCHARGE INSTRUCTIONS

## 2019-06-04 NOTE — [employer] PATIENT INSTRUCTIONS
Discharge Summary/Instructions after an Endoscopic Procedure  Patient Name: Rip Anthony  Patient MRN: [id number]  Patient YOB: 1945 Tuesday, June 04, 2019  Edenilson Gutierrez MD  RESTRICTIONS:  During your procedure today, you received medications for sedation.  These   medications may affect your judgment, balance and coordination.  Therefore,   for 24 hours, you have the following restrictions:   - DO NOT drive a car, operate machinery, make legal/financial decisions,   sign important papers or drink alcohol.    ACTIVITY:  Today: no heavy lifting, straining or running due to procedural   sedation/anesthesia.  The following day: return to full activity including work.  DIET:  Eat and drink normally unless instructed otherwise.     TREATMENT FOR COMMON SIDE EFFECTS:  - Mild abdominal pain, nausea, belching, bloating or excessive gas:  rest,   eat lightly and use a heating pad.  - Sore Throat: treat with throat lozenges and/or gargle with warm salt   water.  - Because air was used during the procedure, expelling large amounts of air   from your rectum or belching is normal.  - If a bowel prep was taken, you may not have a bowel movement for 1-3 days.    This is normal.  SYMPTOMS TO WATCH FOR AND REPORT TO YOUR PHYSICIAN:  1. Abdominal pain or bloating, other than gas cramps.  2. Chest pain.  3. Back pain.  4. Signs of infection such as: chills or fever occurring within 24 hours   after the procedure.  5. Rectal bleeding, which would show as bright red, maroon, or black stools.   (A tablespoon of blood from the rectum is not serious, especially if   hemorrhoids are present.)  6. Vomiting.  7. Weakness or dizziness.  GO DIRECTLY TO THE NEAREST EMERGENCY ROOM IF YOU HAVE ANY OF THE FOLLOWING:      Difficulty breathing              Chills and/or fever over 101 F   Persistent vomiting and/or vomiting blood   Severe abdominal pain   Severe chest pain   Black, tarry stools   Bleeding- more than one tablespoon   Any  other symptom or condition that you feel may need urgent attention  Your doctor recommends these additional instructions:  If any biopsies were taken, your doctors clinic will contact you in 1 to 2   weeks with any results.  - Patient has a contact number available for emergencies.  The signs and   symptoms of potential delayed complications were discussed with the   patient.  Return to normal activities tomorrow.  Written discharge   instructions were provided to the patient.   - Discharge patient to home (ambulatory).   - Resume previous diet.   - Continue present medications.   - Await pathology results.   - Repeat colonoscopy in 5 years for surveillance.  For questions, problems or results please call your physician - Edenilson Gutierrez MD at Work:  [phone].  [facility] [place] [facility], EMERGENCY ROOM PHONE NUMBER: [phone]  IF A COMPLICATION OR EMERGENCY SITUATION ARISES AND YOU ARE UNABLE TO REACH   YOUR PHYSICIAN - GO DIRECTLY TO THE EMERGENCY ROOM.  Edenilson Gutierrez MD  6/4/2019 8:42:17 AM  This report has been verified and signed electronically.  [employer]

## 2019-06-04 NOTE — ANESTHESIA PREPROCEDURE EVALUATION
06/04/2019  Rip Anthony is a 73 y.o., male.    Anesthesia Evaluation    I have reviewed the Patient Summary Reports.    I have reviewed the Nursing Notes.   I have reviewed the Medications.     Review of Systems      Physical Exam  General:  Well nourished    Airway/Jaw/Neck:  Airway Findings: Mouth Opening: Normal Tongue: Normal  General Airway Assessment: Average  Mallampati: III  Improves to II with phonation.  TM Distance: Normal, at least 6 cm  Jaw/Neck Findings:  Neck ROM: Normal ROM            Mental Status:  Mental Status Findings:  Alert and Oriented       Patient Active Problem List   Diagnosis    Mixed hyperlipidemia         Anesthesia Plan  Type of Anesthesia, risks & benefits discussed:  Anesthesia Type:  general, MAC  Patient's Preference:   Intra-op Monitoring Plan: standard ASA monitors  Intra-op Monitoring Plan Comments:   Post Op Pain Control Plan:   Post Op Pain Control Plan Comments:   Induction:   IV  Beta Blocker:  Patient is not currently on a Beta-Blocker (No further documentation required).       Informed Consent: Patient understands risks and agrees with Anesthesia plan.  Questions answered. Anesthesia consent signed with patient.  ASA Score: 2     Day of Surgery Review of History & Physical:    H&P update referred to the surgeon.         Ready For Surgery From Anesthesia Perspective.

## 2019-06-04 NOTE — ANESTHESIA POSTPROCEDURE EVALUATION
Anesthesia Post Evaluation    Patient: Rip Anthony    Procedure(s) Performed: Procedure(s) (LRB):  COLONOSCOPY (N/A)    Final Anesthesia Type: general  Patient location during evaluation: PACU  Patient participation: Yes- Able to Participate  Level of consciousness: awake and alert and oriented  Post-procedure vital signs: reviewed and stable  Pain management: adequate  Airway patency: patent  PONV status at discharge: No PONV  Anesthetic complications: no      Cardiovascular status: stable  Respiratory status: unassisted  Hydration status: euvolemic  Follow-up not needed.          Vitals Value Taken Time   /60 6/4/2019  9:15 AM   Temp 36.5 °C (97.7 °F) 6/4/2019  8:45 AM   Pulse 45 6/4/2019  9:15 AM   Resp 18 6/4/2019  9:15 AM   SpO2 100 % 6/4/2019  9:15 AM         Event Time     Out of Recovery 09:24:44          Pain/Leana Score: Leana Score: 10 (6/4/2019  9:15 AM)

## 2019-06-10 ENCOUNTER — TELEPHONE (OUTPATIENT)
Dept: GASTROENTEROLOGY | Facility: CLINIC | Age: 74
End: 2019-06-10

## 2019-06-10 NOTE — TELEPHONE ENCOUNTER
----- Message from Edenilson Gutierrez MD sent at 6/9/2019  2:12 PM CDT -----  Please call, the polyps were benign

## 2019-06-11 ENCOUNTER — TELEPHONE (OUTPATIENT)
Dept: ENDOSCOPY | Facility: HOSPITAL | Age: 74
End: 2019-06-11

## 2020-01-07 RX ORDER — ATORVASTATIN CALCIUM 20 MG/1
TABLET, FILM COATED ORAL
Qty: 30 TABLET | Refills: 12 | Status: SHIPPED | OUTPATIENT
Start: 2020-01-07 | End: 2020-09-15

## 2020-04-23 DIAGNOSIS — Z00.00 ROUTINE GENERAL MEDICAL EXAMINATION AT A HEALTH CARE FACILITY: Primary | ICD-10-CM

## 2020-06-02 ENCOUNTER — CLINICAL SUPPORT (OUTPATIENT)
Dept: INTERNAL MEDICINE | Facility: CLINIC | Age: 75
End: 2020-06-02
Payer: COMMERCIAL

## 2020-06-02 ENCOUNTER — OFFICE VISIT (OUTPATIENT)
Dept: PULMONOLOGY | Facility: CLINIC | Age: 75
End: 2020-06-02
Payer: COMMERCIAL

## 2020-06-02 ENCOUNTER — HOSPITAL ENCOUNTER (OUTPATIENT)
Dept: CARDIOLOGY | Facility: CLINIC | Age: 75
Discharge: HOME | End: 2020-06-02
Payer: COMMERCIAL

## 2020-06-02 VITALS
DIASTOLIC BLOOD PRESSURE: 78 MMHG | BODY MASS INDEX: 25.98 KG/M2 | WEIGHT: 196 LBS | HEART RATE: 54 BPM | HEIGHT: 73 IN | SYSTOLIC BLOOD PRESSURE: 124 MMHG

## 2020-06-02 DIAGNOSIS — Z00.00 ANNUAL PHYSICAL EXAM: Primary | ICD-10-CM

## 2020-06-02 DIAGNOSIS — Z11.59 SPECIAL SCREENING EXAMINATION FOR UNSPECIFIED VIRAL DISEASE: ICD-10-CM

## 2020-06-02 DIAGNOSIS — Z00.00 ROUTINE GENERAL MEDICAL EXAMINATION AT A HEALTH CARE FACILITY: ICD-10-CM

## 2020-06-02 DIAGNOSIS — Z00.00 ROUTINE GENERAL MEDICAL EXAMINATION AT A HEALTH CARE FACILITY: Primary | ICD-10-CM

## 2020-06-02 LAB
ALBUMIN SERPL BCP-MCNC: 3.9 G/DL (ref 3.5–5.2)
ALP SERPL-CCNC: 47 U/L (ref 55–135)
ALT SERPL W/O P-5'-P-CCNC: 19 U/L (ref 10–44)
ANION GAP SERPL CALC-SCNC: 8 MMOL/L (ref 8–16)
AST SERPL-CCNC: 19 U/L (ref 10–40)
BILIRUB SERPL-MCNC: 1 MG/DL (ref 0.1–1)
BUN SERPL-MCNC: 12 MG/DL (ref 8–23)
CALCIUM SERPL-MCNC: 9.4 MG/DL (ref 8.7–10.5)
CHLORIDE SERPL-SCNC: 109 MMOL/L (ref 95–110)
CHOLEST SERPL-MCNC: 156 MG/DL (ref 120–199)
CHOLEST/HDLC SERPL: 2.7 {RATIO} (ref 2–5)
CO2 SERPL-SCNC: 22 MMOL/L (ref 23–29)
COMPLEXED PSA SERPL-MCNC: 3.5 NG/ML (ref 0–4)
CREAT SERPL-MCNC: 0.9 MG/DL (ref 0.5–1.4)
ERYTHROCYTE [DISTWIDTH] IN BLOOD BY AUTOMATED COUNT: 12.7 % (ref 11.5–14.5)
EST. GFR  (AFRICAN AMERICAN): >60 ML/MIN/1.73 M^2
EST. GFR  (NON AFRICAN AMERICAN): >60 ML/MIN/1.73 M^2
ESTIMATED AVG GLUCOSE: 105 MG/DL (ref 68–131)
GLUCOSE SERPL-MCNC: 106 MG/DL (ref 70–110)
HBA1C MFR BLD HPLC: 5.3 % (ref 4–5.6)
HCT VFR BLD AUTO: 47.4 % (ref 40–54)
HDLC SERPL-MCNC: 58 MG/DL (ref 40–75)
HDLC SERPL: 37.2 % (ref 20–50)
HGB BLD-MCNC: 15.7 G/DL (ref 14–18)
LDLC SERPL CALC-MCNC: 87 MG/DL (ref 63–159)
MCH RBC QN AUTO: 31.4 PG (ref 27–31)
MCHC RBC AUTO-ENTMCNC: 33.1 G/DL (ref 32–36)
MCV RBC AUTO: 95 FL (ref 82–98)
NONHDLC SERPL-MCNC: 98 MG/DL
PLATELET # BLD AUTO: 206 K/UL (ref 150–350)
PMV BLD AUTO: 10 FL (ref 9.2–12.9)
POTASSIUM SERPL-SCNC: 4.3 MMOL/L (ref 3.5–5.1)
PROT SERPL-MCNC: 6.6 G/DL (ref 6–8.4)
RBC # BLD AUTO: 5 M/UL (ref 4.6–6.2)
SARS-COV-2 IGG SERPLBLD QL IA.RAPID: NEGATIVE
SODIUM SERPL-SCNC: 139 MMOL/L (ref 136–145)
TRIGL SERPL-MCNC: 55 MG/DL (ref 30–150)
TSH SERPL DL<=0.005 MIU/L-ACNC: 2.07 UIU/ML (ref 0.4–4)
WBC # BLD AUTO: 6.31 K/UL (ref 3.9–12.7)

## 2020-06-02 PROCEDURE — 99999 PR PBB SHADOW E&M-EST. PATIENT-LVL III: ICD-10-PCS | Mod: PBBFAC,,, | Performed by: INTERNAL MEDICINE

## 2020-06-02 PROCEDURE — 93010 EKG 12-LEAD: ICD-10-PCS | Mod: S$GLB,,, | Performed by: INTERNAL MEDICINE

## 2020-06-02 PROCEDURE — 93010 ELECTROCARDIOGRAM REPORT: CPT | Mod: S$GLB,,, | Performed by: INTERNAL MEDICINE

## 2020-06-02 PROCEDURE — 99387 INIT PM E/M NEW PAT 65+ YRS: CPT | Mod: S$GLB,,, | Performed by: INTERNAL MEDICINE

## 2020-06-02 PROCEDURE — 99387 PR PREVENTIVE VISIT,NEW,65 & OVER: ICD-10-PCS | Mod: S$GLB,,, | Performed by: INTERNAL MEDICINE

## 2020-06-02 PROCEDURE — 80053 COMPREHEN METABOLIC PANEL: CPT

## 2020-06-02 PROCEDURE — 99999 PR PBB SHADOW E&M-EST. PATIENT-LVL III: CPT | Mod: PBBFAC,,, | Performed by: INTERNAL MEDICINE

## 2020-06-02 PROCEDURE — 83036 HEMOGLOBIN GLYCOSYLATED A1C: CPT

## 2020-06-02 PROCEDURE — 93005 ELECTROCARDIOGRAM TRACING: CPT | Mod: S$GLB,,, | Performed by: INTERNAL MEDICINE

## 2020-06-02 PROCEDURE — 80061 LIPID PANEL: CPT

## 2020-06-02 PROCEDURE — 86769 SARS-COV-2 COVID-19 ANTIBODY: CPT

## 2020-06-02 PROCEDURE — 84153 ASSAY OF PSA TOTAL: CPT

## 2020-06-02 PROCEDURE — 93005 EKG 12-LEAD: ICD-10-PCS | Mod: S$GLB,,, | Performed by: INTERNAL MEDICINE

## 2020-06-02 PROCEDURE — 85027 COMPLETE CBC AUTOMATED: CPT

## 2020-06-02 PROCEDURE — 84443 ASSAY THYROID STIM HORMONE: CPT

## 2020-06-02 NOTE — PROGRESS NOTES
Subjective:       Patient ID: Rip Anthony is a 74 y.o. male.    Chief Complaint: Annual Exam    HPI  73 yo  comes for his periodic health exam. He feels well, has no active medical complaints. He has glaucoma and is followed in [place] as well as here in [place],  No significant medical encounters since last visit. 15 yo son is becoming a good . Starting to play the circuit.  Rip has a  that has done him a world of good.  Review of Systems   Eyes:        Hx of glaucoma.   Gastrointestinal:        Hx of colon polyps       Objective:      Physical Exam   Constitutional: He is oriented to person, place, and time. He appears well-developed and well-nourished.   BMI:26   HENT:   Head: Normocephalic and atraumatic.   Right Ear: External ear normal.   Left Ear: External ear normal.   Eyes: Pupils are equal, round, and reactive to light. Conjunctivae and EOM are normal.   Neck: Normal range of motion. Neck supple.   Cardiovascular: Normal rate, regular rhythm and normal heart sounds.   Pulmonary/Chest: Effort normal and breath sounds normal.   Abdominal: Soft. Bowel sounds are normal.   Musculoskeletal: Normal range of motion.   Neurological: He is alert and oriented to person, place, and time. He has normal reflexes.   Skin: Skin is warm and dry.   Psychiatric: He has a normal mood and affect. His behavior is normal. Judgment and thought content normal.       Assessment:       1. Annual physical exam        Plan:       .Labs: All parameters are normal and basically unchanged from the studies done 5/30/19. EKG Sinus bradycardia, normal. Corona Virus antibodies: negative  IMP: Helalhy Male with good health habits.

## 2020-06-02 NOTE — LETTER
June 2, 2020    Rip Anthony  [address]  [facility] [address]             [facility] - Pulmonary Services  [address]  [facility] [address]  Phone: [phone] Dear  Rip,       Thank you for allowing me to serve you and perform your Executive Health exam on 6/2/2020. This letter will serve as a brief summary of the physical findings and laboratory/studies performed and recommendations at this time. Today's assessment is normal and the labs are virtually identical to the studies of May, 2019. Whatever you are doing it agrees with you and don't change a thing.      Enjoy [place], maybe we can get together at our vacations.        If you have any questions or concerns, please don't hesitate to call.    Sincerely,        Davide Parker MD

## 2020-08-31 ENCOUNTER — LAB VISIT (OUTPATIENT)
Dept: LAB | Facility: HOSPITAL | Age: 75
End: 2020-08-31
Attending: INTERNAL MEDICINE
Payer: COMMERCIAL

## 2020-08-31 DIAGNOSIS — Z03.818 ENCOUNTER FOR OBSERVATION FOR SUSPECTED EXPOSURE TO OTHER BIOLOGICAL AGENTS RULED OUT: ICD-10-CM

## 2020-08-31 LAB — SARS-COV-2 RNA RESP QL NAA+PROBE: NOT DETECTED

## 2020-08-31 PROCEDURE — U0003 INFECTIOUS AGENT DETECTION BY NUCLEIC ACID (DNA OR RNA); SEVERE ACUTE RESPIRATORY SYNDROME CORONAVIRUS 2 (SARS-COV-2) (CORONAVIRUS DISEASE [COVID-19]), AMPLIFIED PROBE TECHNIQUE, MAKING USE OF HIGH THROUGHPUT TECHNOLOGIES AS DESCRIBED BY CMS-2020-01-R: HCPCS

## 2020-11-25 ENCOUNTER — IMMUNIZATION (OUTPATIENT)
Dept: PHARMACY | Facility: CLINIC | Age: 75
End: 2020-11-25
Payer: COMMERCIAL

## 2020-11-25 ENCOUNTER — IMMUNIZATION (OUTPATIENT)
Dept: PHARMACY | Facility: CLINIC | Age: 75
End: 2020-11-25

## 2021-01-04 ENCOUNTER — IMMUNIZATION (OUTPATIENT)
Dept: INTERNAL MEDICINE | Facility: CLINIC | Age: 76
End: 2021-01-04
Payer: COMMERCIAL

## 2021-01-04 DIAGNOSIS — Z23 NEED FOR VACCINATION: ICD-10-CM

## 2021-01-04 PROCEDURE — 91300 COVID-19, MRNA, LNP-S, PF, 30 MCG/0.3 ML DOSE VACCINE: CPT | Mod: PBBFAC | Performed by: INTERNAL MEDICINE

## 2021-01-25 ENCOUNTER — IMMUNIZATION (OUTPATIENT)
Dept: INTERNAL MEDICINE | Facility: CLINIC | Age: 76
End: 2021-01-25
Payer: COMMERCIAL

## 2021-01-25 DIAGNOSIS — Z23 NEED FOR VACCINATION: Primary | ICD-10-CM

## 2021-01-25 PROCEDURE — 0002A COVID-19, MRNA, LNP-S, PF, 30 MCG/0.3 ML DOSE VACCINE: CPT | Mod: PBBFAC | Performed by: INTERNAL MEDICINE

## 2021-01-25 PROCEDURE — 91300 COVID-19, MRNA, LNP-S, PF, 30 MCG/0.3 ML DOSE VACCINE: CPT | Mod: PBBFAC | Performed by: INTERNAL MEDICINE

## 2021-03-18 ENCOUNTER — PATIENT MESSAGE (OUTPATIENT)
Dept: RESEARCH | Facility: HOSPITAL | Age: 76
End: 2021-03-18

## 2021-03-26 ENCOUNTER — PATIENT MESSAGE (OUTPATIENT)
Dept: RESEARCH | Facility: HOSPITAL | Age: 76
End: 2021-03-26

## 2021-06-10 ENCOUNTER — TELEPHONE (OUTPATIENT)
Dept: PULMONOLOGY | Facility: CLINIC | Age: 76
End: 2021-06-10

## 2021-06-11 DIAGNOSIS — Z00.00 ROUTINE GENERAL MEDICAL EXAMINATION AT A HEALTH CARE FACILITY: Primary | ICD-10-CM

## 2021-06-24 ENCOUNTER — HOSPITAL ENCOUNTER (OUTPATIENT)
Dept: CARDIOLOGY | Facility: CLINIC | Age: 76
Discharge: HOME | End: 2021-06-24
Payer: COMMERCIAL

## 2021-06-24 ENCOUNTER — CLINICAL SUPPORT (OUTPATIENT)
Dept: INTERNAL MEDICINE | Facility: CLINIC | Age: 76
End: 2021-06-24
Payer: COMMERCIAL

## 2021-06-24 ENCOUNTER — HOSPITAL ENCOUNTER (OUTPATIENT)
Dept: RADIOLOGY | Facility: HOSPITAL | Age: 76
Discharge: HOME | End: 2021-06-24
Attending: INTERNAL MEDICINE
Payer: COMMERCIAL

## 2021-06-24 ENCOUNTER — OFFICE VISIT (OUTPATIENT)
Dept: PULMONOLOGY | Facility: CLINIC | Age: 76
End: 2021-06-24
Payer: COMMERCIAL

## 2021-06-24 VITALS
RESPIRATION RATE: 12 BRPM | HEIGHT: 73 IN | BODY MASS INDEX: 26.11 KG/M2 | WEIGHT: 197 LBS | HEART RATE: 53 BPM | DIASTOLIC BLOOD PRESSURE: 69 MMHG | SYSTOLIC BLOOD PRESSURE: 125 MMHG

## 2021-06-24 DIAGNOSIS — Z00.00 ANNUAL PHYSICAL EXAM: Primary | ICD-10-CM

## 2021-06-24 DIAGNOSIS — Z00.00 ROUTINE GENERAL MEDICAL EXAMINATION AT A HEALTH CARE FACILITY: ICD-10-CM

## 2021-06-24 LAB
ALBUMIN SERPL BCP-MCNC: 3.7 G/DL (ref 3.5–5.2)
ALP SERPL-CCNC: 45 U/L (ref 55–135)
ALT SERPL W/O P-5'-P-CCNC: 17 U/L (ref 10–44)
ANION GAP SERPL CALC-SCNC: 9 MMOL/L (ref 8–16)
AST SERPL-CCNC: 17 U/L (ref 10–40)
BILIRUB SERPL-MCNC: 0.8 MG/DL (ref 0.1–1)
BUN SERPL-MCNC: 13 MG/DL (ref 8–23)
CALCIUM SERPL-MCNC: 9.4 MG/DL (ref 8.7–10.5)
CHLORIDE SERPL-SCNC: 110 MMOL/L (ref 95–110)
CHOLEST SERPL-MCNC: 158 MG/DL (ref 120–199)
CHOLEST/HDLC SERPL: 3.2 {RATIO} (ref 2–5)
CO2 SERPL-SCNC: 25 MMOL/L (ref 23–29)
COMPLEXED PSA SERPL-MCNC: 4.4 NG/ML (ref 0–4)
CREAT SERPL-MCNC: 1 MG/DL (ref 0.5–1.4)
ERYTHROCYTE [DISTWIDTH] IN BLOOD BY AUTOMATED COUNT: 12.7 % (ref 11.5–14.5)
EST. GFR  (AFRICAN AMERICAN): >60 ML/MIN/1.73 M^2
EST. GFR  (NON AFRICAN AMERICAN): >60 ML/MIN/1.73 M^2
ESTIMATED AVG GLUCOSE: 103 MG/DL (ref 68–131)
GLUCOSE SERPL-MCNC: 108 MG/DL (ref 70–110)
HBA1C MFR BLD: 5.2 % (ref 4–5.6)
HCT VFR BLD AUTO: 46 % (ref 40–54)
HDLC SERPL-MCNC: 49 MG/DL (ref 40–75)
HDLC SERPL: 31 % (ref 20–50)
HGB BLD-MCNC: 15.3 G/DL (ref 14–18)
LDLC SERPL CALC-MCNC: 92.2 MG/DL (ref 63–159)
MCH RBC QN AUTO: 31.4 PG (ref 27–31)
MCHC RBC AUTO-ENTMCNC: 33.3 G/DL (ref 32–36)
MCV RBC AUTO: 95 FL (ref 82–98)
NONHDLC SERPL-MCNC: 109 MG/DL
PLATELET # BLD AUTO: 217 K/UL (ref 150–450)
PMV BLD AUTO: 9.8 FL (ref 9.2–12.9)
POTASSIUM SERPL-SCNC: 4.6 MMOL/L (ref 3.5–5.1)
PROT SERPL-MCNC: 6.3 G/DL (ref 6–8.4)
RBC # BLD AUTO: 4.87 M/UL (ref 4.6–6.2)
SODIUM SERPL-SCNC: 144 MMOL/L (ref 136–145)
TRIGL SERPL-MCNC: 84 MG/DL (ref 30–150)
TSH SERPL DL<=0.005 MIU/L-ACNC: 3.02 UIU/ML (ref 0.4–4)
WBC # BLD AUTO: 6.33 K/UL (ref 3.9–12.7)

## 2021-06-24 PROCEDURE — 3288F FALL RISK ASSESSMENT DOCD: CPT | Mod: CPTII,S$GLB,, | Performed by: INTERNAL MEDICINE

## 2021-06-24 PROCEDURE — 93005 ELECTROCARDIOGRAM TRACING: CPT | Mod: S$GLB,,, | Performed by: INTERNAL MEDICINE

## 2021-06-24 PROCEDURE — 83036 HEMOGLOBIN GLYCOSYLATED A1C: CPT | Performed by: INTERNAL MEDICINE

## 2021-06-24 PROCEDURE — 84443 ASSAY THYROID STIM HORMONE: CPT | Performed by: INTERNAL MEDICINE

## 2021-06-24 PROCEDURE — 99999 PR PBB SHADOW E&M-EST. PATIENT-LVL III: CPT | Mod: PBBFAC,,, | Performed by: INTERNAL MEDICINE

## 2021-06-24 PROCEDURE — 84153 ASSAY OF PSA TOTAL: CPT | Performed by: INTERNAL MEDICINE

## 2021-06-24 PROCEDURE — 1101F PT FALLS ASSESS-DOCD LE1/YR: CPT | Mod: CPTII,S$GLB,, | Performed by: INTERNAL MEDICINE

## 2021-06-24 PROCEDURE — 93010 ELECTROCARDIOGRAM REPORT: CPT | Mod: S$GLB,,, | Performed by: INTERNAL MEDICINE

## 2021-06-24 PROCEDURE — 93010 EKG 12-LEAD: ICD-10-PCS | Mod: S$GLB,,, | Performed by: INTERNAL MEDICINE

## 2021-06-24 PROCEDURE — 85027 COMPLETE CBC AUTOMATED: CPT | Performed by: INTERNAL MEDICINE

## 2021-06-24 PROCEDURE — 1126F AMNT PAIN NOTED NONE PRSNT: CPT | Mod: S$GLB,,, | Performed by: INTERNAL MEDICINE

## 2021-06-24 PROCEDURE — 71046 X-RAY EXAM CHEST 2 VIEWS: CPT | Mod: 26,,, | Performed by: RADIOLOGY

## 2021-06-24 PROCEDURE — 99999 PR PBB SHADOW E&M-EST. PATIENT-LVL III: ICD-10-PCS | Mod: PBBFAC,,, | Performed by: INTERNAL MEDICINE

## 2021-06-24 PROCEDURE — 71046 X-RAY EXAM CHEST 2 VIEWS: CPT | Mod: TC,FY

## 2021-06-24 PROCEDURE — 93005 EKG 12-LEAD: ICD-10-PCS | Mod: S$GLB,,, | Performed by: INTERNAL MEDICINE

## 2021-06-24 PROCEDURE — 3288F PR FALLS RISK ASSESSMENT DOCUMENTED: ICD-10-PCS | Mod: CPTII,S$GLB,, | Performed by: INTERNAL MEDICINE

## 2021-06-24 PROCEDURE — 1101F PR PT FALLS ASSESS DOC 0-1 FALLS W/OUT INJ PAST YR: ICD-10-PCS | Mod: CPTII,S$GLB,, | Performed by: INTERNAL MEDICINE

## 2021-06-24 PROCEDURE — 80053 COMPREHEN METABOLIC PANEL: CPT | Performed by: INTERNAL MEDICINE

## 2021-06-24 PROCEDURE — 80061 LIPID PANEL: CPT | Performed by: INTERNAL MEDICINE

## 2021-06-24 PROCEDURE — 99214 PR OFFICE/OUTPT VISIT, EST, LEVL IV, 30-39 MIN: ICD-10-PCS | Mod: S$GLB,,, | Performed by: INTERNAL MEDICINE

## 2021-06-24 PROCEDURE — 71046 XR CHEST PA AND LATERAL: ICD-10-PCS | Mod: 26,,, | Performed by: RADIOLOGY

## 2021-06-24 PROCEDURE — 1126F PR PAIN SEVERITY QUANTIFIED, NO PAIN PRESENT: ICD-10-PCS | Mod: S$GLB,,, | Performed by: INTERNAL MEDICINE

## 2021-06-24 PROCEDURE — 99214 OFFICE O/P EST MOD 30 MIN: CPT | Mod: S$GLB,,, | Performed by: INTERNAL MEDICINE

## 2021-06-24 RX ORDER — CIPROFLOXACIN 500 MG/1
500 TABLET ORAL 2 TIMES DAILY
Qty: 60 TABLET | Refills: 1 | Status: SHIPPED | OUTPATIENT
Start: 2021-06-24

## 2021-07-05 ENCOUNTER — TELEPHONE (OUTPATIENT)
Dept: UROLOGY | Facility: HOSPITAL | Age: 76
End: 2021-07-05

## 2021-07-05 DIAGNOSIS — N40.1 BENIGN NON-NODULAR PROSTATIC HYPERPLASIA WITH LOWER URINARY TRACT SYMPTOMS: Primary | ICD-10-CM

## 2021-10-04 ENCOUNTER — IMMUNIZATION (OUTPATIENT)
Dept: INTERNAL MEDICINE | Facility: CLINIC | Age: 76
End: 2021-10-04
Payer: COMMERCIAL

## 2021-10-04 DIAGNOSIS — Z23 NEED FOR VACCINATION: Primary | ICD-10-CM

## 2021-10-04 PROCEDURE — 0003A COVID-19, MRNA, LNP-S, PF, 30 MCG/0.3 ML DOSE VACCINE: CPT | Mod: CV19,PBBFAC | Performed by: INTERNAL MEDICINE

## 2021-10-04 PROCEDURE — 91300 COVID-19, MRNA, LNP-S, PF, 30 MCG/0.3 ML DOSE VACCINE: CPT | Mod: PBBFAC | Performed by: INTERNAL MEDICINE

## 2022-01-13 ENCOUNTER — TELEPHONE (OUTPATIENT)
Dept: UROLOGY | Facility: HOSPITAL | Age: 77
End: 2022-01-13
Payer: COMMERCIAL

## 2022-01-13 NOTE — TELEPHONE ENCOUNTER
Spoke to patient's wife, Thania, who informed me that Judge Anthony has sought care though an internist and urologist who rechecked his psa and was reportedly normal.     They will let us know if they need anything.

## 2022-02-08 ENCOUNTER — CLINICAL SUPPORT (OUTPATIENT)
Dept: INTERNAL MEDICINE | Facility: CLINIC | Age: 77
End: 2022-02-08
Payer: COMMERCIAL

## 2022-02-08 DIAGNOSIS — Z00.00 ROUTINE GENERAL MEDICAL EXAMINATION AT A HEALTH CARE FACILITY: Primary | ICD-10-CM

## 2022-02-08 PROCEDURE — 90471 ZOSTER RECOMBINANT VACCINE: ICD-10-PCS | Mod: S$GLB,,, | Performed by: INTERNAL MEDICINE

## 2022-02-08 PROCEDURE — 90471 IMMUNIZATION ADMIN: CPT | Mod: S$GLB,,, | Performed by: INTERNAL MEDICINE

## 2022-02-08 PROCEDURE — 99999 PR PBB SHADOW E&M-EST. PATIENT-LVL I: CPT | Mod: PBBFAC,,,

## 2022-02-08 PROCEDURE — 90750 HZV VACC RECOMBINANT IM: CPT | Mod: S$GLB,,, | Performed by: INTERNAL MEDICINE

## 2022-02-08 PROCEDURE — 90750 ZOSTER RECOMBINANT VACCINE: ICD-10-PCS | Mod: S$GLB,,, | Performed by: INTERNAL MEDICINE

## 2022-02-08 PROCEDURE — 99999 PR PBB SHADOW E&M-EST. PATIENT-LVL I: ICD-10-PCS | Mod: PBBFAC,,,

## 2022-04-08 ENCOUNTER — IMMUNIZATION (OUTPATIENT)
Dept: INTERNAL MEDICINE | Facility: CLINIC | Age: 77
End: 2022-04-08
Payer: COMMERCIAL

## 2022-04-08 DIAGNOSIS — Z23 NEED FOR VACCINATION: Primary | ICD-10-CM

## 2022-04-08 PROCEDURE — 91305 COVID-19, MRNA, LNP-S, PF, 30 MCG/0.3 ML DOSE VACCINE (PFIZER): CPT | Mod: PBBFAC | Performed by: INTERNAL MEDICINE

## 2022-06-07 DIAGNOSIS — Z00.00 ROUTINE GENERAL MEDICAL EXAMINATION AT A HEALTH CARE FACILITY: Primary | ICD-10-CM

## 2022-06-29 ENCOUNTER — CLINICAL SUPPORT (OUTPATIENT)
Dept: INTERNAL MEDICINE | Facility: CLINIC | Age: 77
End: 2022-06-29
Payer: COMMERCIAL

## 2022-06-29 ENCOUNTER — HOSPITAL ENCOUNTER (OUTPATIENT)
Dept: CARDIOLOGY | Facility: HOSPITAL | Age: 77
Discharge: HOME | End: 2022-06-29
Attending: INTERNAL MEDICINE
Payer: COMMERCIAL

## 2022-06-29 ENCOUNTER — OFFICE VISIT (OUTPATIENT)
Dept: PULMONOLOGY | Facility: CLINIC | Age: 77
End: 2022-06-29
Payer: COMMERCIAL

## 2022-06-29 VITALS — WEIGHT: 197 LBS | BODY MASS INDEX: 26.11 KG/M2 | HEIGHT: 73 IN

## 2022-06-29 VITALS
HEIGHT: 73 IN | SYSTOLIC BLOOD PRESSURE: 117 MMHG | BODY MASS INDEX: 24.92 KG/M2 | DIASTOLIC BLOOD PRESSURE: 73 MMHG | WEIGHT: 188 LBS | HEART RATE: 52 BPM

## 2022-06-29 DIAGNOSIS — Z00.00 ANNUAL PHYSICAL EXAM: Primary | ICD-10-CM

## 2022-06-29 DIAGNOSIS — Z00.00 ROUTINE GENERAL MEDICAL EXAMINATION AT A HEALTH CARE FACILITY: ICD-10-CM

## 2022-06-29 LAB
ALBUMIN SERPL BCP-MCNC: 3.9 G/DL (ref 3.5–5.2)
ALP SERPL-CCNC: 47 U/L (ref 55–135)
ALT SERPL W/O P-5'-P-CCNC: 15 U/L (ref 10–44)
ANION GAP SERPL CALC-SCNC: 4 MMOL/L (ref 8–16)
AST SERPL-CCNC: 15 U/L (ref 10–40)
BILIRUB SERPL-MCNC: 1 MG/DL (ref 0.1–1)
BUN SERPL-MCNC: 11 MG/DL (ref 8–23)
CALCIUM SERPL-MCNC: 9.4 MG/DL (ref 8.7–10.5)
CHLORIDE SERPL-SCNC: 108 MMOL/L (ref 95–110)
CHOLEST SERPL-MCNC: 148 MG/DL (ref 120–199)
CHOLEST/HDLC SERPL: 2.8 {RATIO} (ref 2–5)
CO2 SERPL-SCNC: 29 MMOL/L (ref 23–29)
COMPLEXED PSA SERPL-MCNC: 4.8 NG/ML (ref 0–4)
CREAT SERPL-MCNC: 1 MG/DL (ref 0.5–1.4)
CV STRESS BASE HR: 61 BPM
DIASTOLIC BLOOD PRESSURE: 78 MMHG
ERYTHROCYTE [DISTWIDTH] IN BLOOD BY AUTOMATED COUNT: 12.2 % (ref 11.5–14.5)
EST. GFR  (AFRICAN AMERICAN): >60 ML/MIN/1.73 M^2
EST. GFR  (NON AFRICAN AMERICAN): >60 ML/MIN/1.73 M^2
ESTIMATED AVG GLUCOSE: 105 MG/DL (ref 68–131)
GLUCOSE SERPL-MCNC: 104 MG/DL (ref 70–110)
HBA1C MFR BLD: 5.3 % (ref 4–5.6)
HCT VFR BLD AUTO: 48.7 % (ref 40–54)
HDLC SERPL-MCNC: 52 MG/DL (ref 40–75)
HDLC SERPL: 35.1 % (ref 20–50)
HGB BLD-MCNC: 15.8 G/DL (ref 14–18)
LDLC SERPL CALC-MCNC: 83.6 MG/DL (ref 63–159)
MCH RBC QN AUTO: 31.3 PG (ref 27–31)
MCHC RBC AUTO-ENTMCNC: 32.4 G/DL (ref 32–36)
MCV RBC AUTO: 96 FL (ref 82–98)
NONHDLC SERPL-MCNC: 96 MG/DL
OHS CV CPX 1 MINUTE RECOVERY HEART RATE: 136 BPM
OHS CV CPX 85 PERCENT MAX PREDICTED HEART RATE MALE: 121
OHS CV CPX ESTIMATED METS: 15
OHS CV CPX MAX PREDICTED HEART RATE: 142
OHS CV CPX PATIENT IS FEMALE: 0
OHS CV CPX PATIENT IS MALE: 1
OHS CV CPX PEAK DIASTOLIC BLOOD PRESSURE: 106 MMHG
OHS CV CPX PEAK HEAR RATE: 157 BPM
OHS CV CPX PEAK RATE PRESSURE PRODUCT: NORMAL
OHS CV CPX PEAK SYSTOLIC BLOOD PRESSURE: 215 MMHG
OHS CV CPX PERCENT MAX PREDICTED HEART RATE ACHIEVED: 111
OHS CV CPX RATE PRESSURE PRODUCT PRESENTING: 9028
PLATELET # BLD AUTO: 208 K/UL (ref 150–450)
PMV BLD AUTO: 10.2 FL (ref 9.2–12.9)
POTASSIUM SERPL-SCNC: 4.3 MMOL/L (ref 3.5–5.1)
PROT SERPL-MCNC: 6.5 G/DL (ref 6–8.4)
RBC # BLD AUTO: 5.05 M/UL (ref 4.6–6.2)
SODIUM SERPL-SCNC: 141 MMOL/L (ref 136–145)
STRESS ECHO POST EXERCISE DUR MIN: 8 MINUTES
STRESS ECHO POST EXERCISE DUR SEC: 50 SECONDS
SYSTOLIC BLOOD PRESSURE: 148 MMHG
TRIGL SERPL-MCNC: 62 MG/DL (ref 30–150)
TSH SERPL DL<=0.005 MIU/L-ACNC: 1.96 UIU/ML (ref 0.4–4)
WBC # BLD AUTO: 6.71 K/UL (ref 3.9–12.7)

## 2022-06-29 PROCEDURE — 83036 HEMOGLOBIN GLYCOSYLATED A1C: CPT | Performed by: INTERNAL MEDICINE

## 2022-06-29 PROCEDURE — 1125F AMNT PAIN NOTED PAIN PRSNT: CPT | Mod: CPTII,S$GLB,, | Performed by: INTERNAL MEDICINE

## 2022-06-29 PROCEDURE — 1125F PR PAIN SEVERITY QUANTIFIED, PAIN PRESENT: ICD-10-PCS | Mod: CPTII,S$GLB,, | Performed by: INTERNAL MEDICINE

## 2022-06-29 PROCEDURE — 93016 EXERCISE STRESS - EKG (CUPID ONLY): ICD-10-PCS | Mod: ,,, | Performed by: INTERNAL MEDICINE

## 2022-06-29 PROCEDURE — 3074F PR MOST RECENT SYSTOLIC BLOOD PRESSURE < 130 MM HG: ICD-10-PCS | Mod: CPTII,S$GLB,, | Performed by: INTERNAL MEDICINE

## 2022-06-29 PROCEDURE — 85027 COMPLETE CBC AUTOMATED: CPT | Performed by: INTERNAL MEDICINE

## 2022-06-29 PROCEDURE — 1159F PR MEDICATION LIST DOCUMENTED IN MEDICAL RECORD: ICD-10-PCS | Mod: CPTII,S$GLB,, | Performed by: INTERNAL MEDICINE

## 2022-06-29 PROCEDURE — 1101F PT FALLS ASSESS-DOCD LE1/YR: CPT | Mod: CPTII,S$GLB,, | Performed by: INTERNAL MEDICINE

## 2022-06-29 PROCEDURE — 99999 PR PBB SHADOW E&M-EST. PATIENT-LVL I: CPT | Mod: PBBFAC,,,

## 2022-06-29 PROCEDURE — 1101F PR PT FALLS ASSESS DOC 0-1 FALLS W/OUT INJ PAST YR: ICD-10-PCS | Mod: CPTII,S$GLB,, | Performed by: INTERNAL MEDICINE

## 2022-06-29 PROCEDURE — 93018 EXERCISE STRESS - EKG (CUPID ONLY): ICD-10-PCS | Mod: ,,, | Performed by: INTERNAL MEDICINE

## 2022-06-29 PROCEDURE — 3288F FALL RISK ASSESSMENT DOCD: CPT | Mod: CPTII,S$GLB,, | Performed by: INTERNAL MEDICINE

## 2022-06-29 PROCEDURE — 99387 INIT PM E/M NEW PAT 65+ YRS: CPT | Mod: S$GLB,,, | Performed by: INTERNAL MEDICINE

## 2022-06-29 PROCEDURE — 80061 LIPID PANEL: CPT | Performed by: INTERNAL MEDICINE

## 2022-06-29 PROCEDURE — 3078F DIAST BP <80 MM HG: CPT | Mod: CPTII,S$GLB,, | Performed by: INTERNAL MEDICINE

## 2022-06-29 PROCEDURE — 3288F PR FALLS RISK ASSESSMENT DOCUMENTED: ICD-10-PCS | Mod: CPTII,S$GLB,, | Performed by: INTERNAL MEDICINE

## 2022-06-29 PROCEDURE — 99387 PR PREVENTIVE VISIT,NEW,65 & OVER: ICD-10-PCS | Mod: S$GLB,,, | Performed by: INTERNAL MEDICINE

## 2022-06-29 PROCEDURE — 1159F MED LIST DOCD IN RCRD: CPT | Mod: CPTII,S$GLB,, | Performed by: INTERNAL MEDICINE

## 2022-06-29 PROCEDURE — 99999 PR PBB SHADOW E&M-EST. PATIENT-LVL III: ICD-10-PCS | Mod: PBBFAC,,, | Performed by: INTERNAL MEDICINE

## 2022-06-29 PROCEDURE — 93018 CV STRESS TEST I&R ONLY: CPT | Mod: ,,, | Performed by: INTERNAL MEDICINE

## 2022-06-29 PROCEDURE — 93017 CV STRESS TEST TRACING ONLY: CPT

## 2022-06-29 PROCEDURE — 80053 COMPREHEN METABOLIC PANEL: CPT | Performed by: INTERNAL MEDICINE

## 2022-06-29 PROCEDURE — 3074F SYST BP LT 130 MM HG: CPT | Mod: CPTII,S$GLB,, | Performed by: INTERNAL MEDICINE

## 2022-06-29 PROCEDURE — 3078F PR MOST RECENT DIASTOLIC BLOOD PRESSURE < 80 MM HG: ICD-10-PCS | Mod: CPTII,S$GLB,, | Performed by: INTERNAL MEDICINE

## 2022-06-29 PROCEDURE — 93016 CV STRESS TEST SUPVJ ONLY: CPT | Mod: ,,, | Performed by: INTERNAL MEDICINE

## 2022-06-29 PROCEDURE — 99999 PR PBB SHADOW E&M-EST. PATIENT-LVL I: ICD-10-PCS | Mod: PBBFAC,,,

## 2022-06-29 PROCEDURE — 84443 ASSAY THYROID STIM HORMONE: CPT | Performed by: INTERNAL MEDICINE

## 2022-06-29 PROCEDURE — 84153 ASSAY OF PSA TOTAL: CPT | Performed by: INTERNAL MEDICINE

## 2022-06-29 PROCEDURE — 99999 PR PBB SHADOW E&M-EST. PATIENT-LVL III: CPT | Mod: PBBFAC,,, | Performed by: INTERNAL MEDICINE

## 2022-06-29 NOTE — LETTER
June 30, 2022    Rip Fayard  [address]  [facility] [address]             [facility] - Pulmonary Sv[facility] 9th Fl  [address]  [facility] [address]  Phone: [phone] Dear  Rip,      Thank you for allowing me to serve you and perform your Executive Health exam on 6/29/2022. This letter will serve as a brief summary of the physical findings and laboratory/studies performed and recommendations at this time. Today's assessment is essentially normal.  Your performance on the treadmill was excellent  Enjoy [place].        If you have any questions or concerns, please don't hesitate to call.    Sincerely,        Davide Parker MD

## 2022-06-29 NOTE — PROGRESS NOTES
Subjective:       Patient ID: Rip Anthony is a 76 y.o. male.    Chief Complaint: Annual Exam    HPI  75 yo friend and patient comes for his periodic health exam. He plans to go to [place] for several weeks in the near future. He has no active medical complaints, has  Glaucoma and is being followed by a specialist in [place]. He has had no intercurrent  Illnesses since his visit with me last June. Exercises at home on a regular basis.Takes lipitor a 81 mg ASA and eye drops.  Review of Systems   Constitutional: Negative.    HENT: Negative.    Eyes: Negative.         Glaucoma  Takes two eye gtts.   Respiratory: Negative.    Cardiovascular: Negative.    Gastrointestinal: Negative.    Genitourinary: Negative.    Musculoskeletal: Negative.    Integumentary:  Negative.   Neurological: Negative.    Psychiatric/Behavioral: Negative.    All other systems reviewed and are negative.        Objective:      Physical Exam  Constitutional:       Appearance: He is well-developed.   HENT:      Head: Normocephalic and atraumatic.      Right Ear: External ear normal.      Left Ear: External ear normal.   Eyes:      Conjunctiva/sclera: Conjunctivae normal.      Pupils: Pupils are equal, round, and reactive to light.   Cardiovascular:      Rate and Rhythm: Normal rate and regular rhythm.      Heart sounds: Normal heart sounds.   Pulmonary:      Effort: Pulmonary effort is normal.      Breath sounds: Normal breath sounds.      Comments: Peak flow: 500 l/min  Abdominal:      General: Bowel sounds are normal.      Palpations: Abdomen is soft.   Musculoskeletal:         General: Normal range of motion.      Cervical back: Normal range of motion and neck supple.   Skin:     General: Skin is warm and dry.   Neurological:      Mental Status: He is alert and oriented to person, place, and time.      Deep Tendon Reflexes: Reflexes are normal and symmetric.   Psychiatric:         Behavior: Behavior normal.         Thought Content: Thought  content normal.         Judgment: Judgment normal.         Assessment:       Problem List Items Addressed This Visit    None         Plan:         Labs;   All parameters are normal, PSA:4,8, last year 4.4  Repeat PSA in six months.    Stress EKG: Negative for ischemia and he did 15 METS or work, an excellent level for a your man his age.    IMP: Healthy Male with moderate glaucoma

## 2022-09-28 ENCOUNTER — IMMUNIZATION (OUTPATIENT)
Dept: INTERNAL MEDICINE | Facility: CLINIC | Age: 77
End: 2022-09-28
Payer: COMMERCIAL

## 2022-09-28 DIAGNOSIS — Z23 NEED FOR VACCINATION: Primary | ICD-10-CM

## 2022-09-28 PROCEDURE — 91312 COVID-19, MRNA, LNP-S, BIVALENT BOOSTER, PF, 30 MCG/0.3 ML DOSE: ICD-10-PCS | Mod: S$GLB,,, | Performed by: INTERNAL MEDICINE

## 2022-09-28 PROCEDURE — 0124A COVID-19, MRNA, LNP-S, BIVALENT BOOSTER, PF, 30 MCG/0.3 ML DOSE: CPT | Mod: CV19,PBBFAC | Performed by: INTERNAL MEDICINE

## 2022-09-28 PROCEDURE — 0124A PR IMMUNIZ ADMIN, SARS-COV- 2 COVID-19 VACCINE, 30MCG/0.3ML, BIVALENT, BOOSTER DOSE: CPT | Mod: S$GLB,,, | Performed by: INTERNAL MEDICINE

## 2022-09-28 PROCEDURE — 91312 COVID-19, MRNA, LNP-S, BIVALENT BOOSTER, PF, 30 MCG/0.3 ML DOSE: CPT | Mod: S$GLB,,, | Performed by: INTERNAL MEDICINE

## 2022-09-28 PROCEDURE — 0124A PR IMMUNIZ ADMIN, SARS-COV- 2 COVID-19 VACCINE, 30MCG/0.3ML, BIVALENT, BOOSTER DOSE: ICD-10-PCS | Mod: S$GLB,,, | Performed by: INTERNAL MEDICINE

## 2023-05-14 ENCOUNTER — HOSPITAL ENCOUNTER (EMERGENCY)
Facility: HOSPITAL | Age: 78
Discharge: HOME | End: 2023-05-14
Attending: EMERGENCY MEDICINE
Payer: COMMERCIAL

## 2023-05-14 VITALS
HEART RATE: 51 BPM | BODY MASS INDEX: 25.18 KG/M2 | HEIGHT: 73 IN | SYSTOLIC BLOOD PRESSURE: 175 MMHG | DIASTOLIC BLOOD PRESSURE: 70 MMHG | WEIGHT: 190 LBS | TEMPERATURE: 98 F | RESPIRATION RATE: 16 BRPM | OXYGEN SATURATION: 99 %

## 2023-05-14 DIAGNOSIS — S80.01XA CONTUSION OF RIGHT KNEE, INITIAL ENCOUNTER: ICD-10-CM

## 2023-05-14 DIAGNOSIS — S00.83XA FACIAL CONTUSION, INITIAL ENCOUNTER: ICD-10-CM

## 2023-05-14 DIAGNOSIS — S00.81XA FACIAL ABRASION, INITIAL ENCOUNTER: ICD-10-CM

## 2023-05-14 DIAGNOSIS — S09.90XA HEAD TRAUMA, INITIAL ENCOUNTER: Primary | ICD-10-CM

## 2023-05-14 DIAGNOSIS — S16.1XXA CERVICAL STRAIN, ACUTE, INITIAL ENCOUNTER: ICD-10-CM

## 2023-05-14 DIAGNOSIS — S89.91XA RIGHT KNEE INJURY, INITIAL ENCOUNTER: ICD-10-CM

## 2023-05-14 DIAGNOSIS — R91.1 PULMONARY NODULE LESS THAN 6 MM DETERMINED BY COMPUTED TOMOGRAPHY OF LUNG: ICD-10-CM

## 2023-05-14 PROCEDURE — 90715 TDAP VACCINE 7 YRS/> IM: CPT | Performed by: EMERGENCY MEDICINE

## 2023-05-14 PROCEDURE — 99285 EMERGENCY DEPT VISIT HI MDM: CPT | Mod: 25

## 2023-05-14 PROCEDURE — 63600175 PHARM REV CODE 636 W HCPCS: Performed by: EMERGENCY MEDICINE

## 2023-05-14 PROCEDURE — 99284 PR EMERGENCY DEPT VISIT,LEVEL IV: ICD-10-PCS | Mod: ,,, | Performed by: EMERGENCY MEDICINE

## 2023-05-14 PROCEDURE — 90471 IMMUNIZATION ADMIN: CPT | Performed by: EMERGENCY MEDICINE

## 2023-05-14 PROCEDURE — 99284 EMERGENCY DEPT VISIT MOD MDM: CPT | Mod: ,,, | Performed by: EMERGENCY MEDICINE

## 2023-05-14 RX ADMIN — TETANUS TOXOID, REDUCED DIPHTHERIA TOXOID AND ACELLULAR PERTUSSIS VACCINE, ADSORBED 0.5 ML: 5; 2.5; 8; 8; 2.5 SUSPENSION INTRAMUSCULAR at 06:05

## 2023-05-14 NOTE — ED PROVIDER NOTES
Encounter Date: 5/14/2023       History     Chief Complaint   Patient presents with    Fall     Tripped and fell uneven side walk last night, no loc takes baby asa,  abrasion to nose and under r eye, pain to both sides of vertebrae      The patient is a 77-year-old male, accompanied by his wife, who presents to the emergency department after a mechanical fall.  He and his wife state that the fall occurred yesterday evening.  He was walking when he tripped in a pothole.  He fell forward and struck his face on the concrete.  His wife states that he had momentary loss of consciousness when he struck his face on the ground.  The patient reports that he had a headache yesterday evening.  He took 2 Tylenol and has not had a headache since.  He denies any associated nausea, visual changes, numbness, weakness, or incontinence.  His current complaints are facial pain, right knee pain, and a minor abrasion to the right hand.  He also reports mild posterior neck pain.  Immediately after the fall yesterday evening, a medical assistant that was at the restaurant where the patient was cleaned his facial abrasions.  The patient does continue to report facial pain.  He denies chest pain or shortness of breath.  He has no other complaints.    Review of patient's allergies indicates:   Allergen Reactions    No known allergies      Past Medical History:   Diagnosis Date    Mixed hyperlipidemia 5/4/2017    Retina disorder      Past Surgical History:   Procedure Laterality Date    BACK SURGERY      COLONOSCOPY N/A 6/4/2019    Procedure: COLONOSCOPY;  Surgeon: Edenilson Gutierrez MD;  Location: [address];  Service: Endoscopy;  Laterality: N/A;  Patient is a Benefactor-requested     RETINOPATHY SURGERY       Family History   Problem Relation Age of Onset    Heart failure Mother     Emphysema Mother     Heart failure Father     Emphysema Father      Social History     Tobacco Use    Smoking status: Never    Smokeless tobacco:  Never   Substance Use Topics    Alcohol use: Yes     Alcohol/week: 5.0 standard drinks     Types: 5 Glasses of wine per week    Drug use: No     Review of Systems  General: Denies generalized weakness.  HENT:  Reports facial pain.  Reports facial abrasions.  Eyes: Denies visual changes.  Denies eye pain.    Cardiovascular: Denies chest pain.  Denies shortness of breath.  Respiratory: Denies shortness of breath.    GI: Denies abdominal pain.  Denies nausea.  Denies vomiting.    : Denies incontinence.  Skin:  Reports abrasions to the face and right hand.  Reports ecchymosis to the face.  Neuro:  Reports resolved headache.  Reports head trauma.  Denies numbness.  Denies focal weakness.  Musculoskeletal:  Reports minor neck pain.  Denies back pain.  Reports mild right lower extremity pain.  Reports mild right lower extremity swelling.        Physical Exam     Initial Vitals [05/14/23 1649]   BP Pulse Resp Temp SpO2   (!) 163/72 64 18 98.1 °F (36.7 °C) 97 %      MAP       --         Physical Exam  General: No apparent distress.  Well-nourished.  Well-developed.  Alert and oriented x3.  HENT:  Oropharynx clear.  Negative raccoon's eyes.  No king sign.  No septal hematoma.  Abrasions are noted to the right frontal area, right upper lip, and nose.  Ecchymosis is noted to the right periorbital region.  There is mild inferior orbital rim tenderness to palpation without crepitus or obvious deformity.  Eyes: Pupils equally round and reactive to light.  Extraocular movements intact.  No scleral icterus.  No conjunctival pallor.  Cardiovascular: Regular rate and rhythm.  No murmurs, rubs, or gallops.  Brisk capillary fill.  2+ distal pulses.  Respiratory: Clear to auscultation bilaterally.  No wheezes, rales, or rhonchi.  No respiratory distress.  Abdomen: Soft.  Nontender.  Nondistended.  No guarding.  No rebound.    Skin: Abrasions are noted to the right frontal area, right upper lip, and nose.  Ecchymosis is noted to the  right periorbital region.  Mild abrasion is also noted to the right hand along the palmar aspect.  No abrasion or ecchymosis is noted to the right knee.  Neuro: Cranial nerves II through XII grossly intact.  Moving all extremities equally.  No sensory deficits.  Strength 5 out of 5 in all 4 extremities.  Musculoskeletal: Neck supple.  No midline C-spine, T-spine, or L-spine tenderness to palpation, crepitus, or step-offs.  There is tenderness to palpation to the paraspinal muscles along the cervical spine bilaterally.  There is also right anterior knee tenderness to palpation without crepitus or obvious deformity.  Full range of motion of the knee with minimal pain.  No obvious swelling is noted to the right knee.  No hand tenderness.  No upper extremity or left lower extremity tenderness.  Pelvis stable.  No hip tenderness.      ED Course   Procedures  Labs Reviewed   HIV 1 / 2 ANTIBODY   HEPATITIS C ANTIBODY          Imaging Results              X-Ray Knee 3 View Right (Final result)  Result time 05/14/23 18:33:55   Procedure changed from X-Ray Knee Complete 4 Or More Views Right     Final result by Noel Weiner MD (05/14/23 18:33:55)                   Impression:      1. No acute displaced fracture or dislocation of the knee.      Electronically signed by: Noel Weiner MD  Date:    05/14/2023  Time:    18:33               Narrative:    EXAMINATION:  XR KNEE 3 VIEW RIGHT    CLINICAL HISTORY:  Right Knee Injury; Unspecified injury of right lower leg, initial encounter    TECHNIQUE:  AP, lateral, and Merchant views of the right knee were performed.    COMPARISON:  None    FINDINGS:  Three views right knee.    No acute displaced fracture or dislocation of the knee.  No radiopaque foreign body.  There is osteopenia.  No large knee joint effusion.                                       CT Head Without Contrast (Final result)  Result time 05/14/23 18:49:56      Final result by Pedro Deng MD (05/14/23 18:49:56)                    Impression:      CT head:    No evidence of acute intracranial abnormality.  Specifically, no evidence of acute intracranial hemorrhage or acute infarct.    Mild generalized cerebral volume loss and chronic microvascular ischemic change.    CT maxillofacial:    No evidence of an acute displaced fracture.    Mild soft tissue swelling overlying the frontal and periorbital calvarium, right greater than left, presumably related to recent reported trauma.    Soft tissue gas overlying the nasal process of the maxilla, in keeping with recent trauma.    Additional findings as above.    Electronically signed by resident: Martinez Coates  Date:    05/14/2023  Time:    18:00    Electronically signed by: Pedro Deng MD  Date:    05/14/2023  Time:    18:49               Narrative:    EXAMINATION:  CT MAXILLOFACIAL WITHOUT CONTRAST; CT HEAD WITHOUT CONTRAST    CLINICAL HISTORY:  Facial trauma, blunt;; Head trauma, minor (Age >= 65y);    TECHNIQUE:  Low dose CT images throughout the region of the head and facial bones.  Axial, sagittal, and coronal reformations were obtained.  Contrast was not administered.    COMPARISON:  No priors.    FINDINGS:  Head:    Prominence of the ventricles and sulci compatible with mild generalized cerebral volume loss.  No hydrocephalus.    Mild hypoattenuation of the supratentorial white matter while nonspecific may reflect chronic microvascular ischemic change.  No parenchymal mass, hemorrhage or recent or remote major vascular distribution infarct.    No extra-axial blood or fluid collections.    The calvarium appears intact, without evidence of depressed skull fracture.  Mild calcifications at the skull base.    Facial bones:    Subcutaneous soft tissue swelling overlying the frontal calvarium and periorbital regions, right greater than left.  There is soft tissue gas overlying the nasal process of the maxilla.  Postoperative change in the bilateral globes.  The globes and  intraorbital contents are otherwise within normal limits.  No orbital fracture.    The remainder of the facial bones appear intact without evidence of an acute displaced fracture.  No osseous destructive lesions.    Temporomandibular joints appropriately position without evidence of dislocation.  Diffuse maxillary periodontal disease.    Trace mucosal thickening of the ethmoid sinuses. Paranasal sinuses are otherwise essentially clear.  Mastoids are clear.  The craniocervical junction appears within normal limits.                                       CT Cervical Spine Without Contrast (Final result)  Result time 05/14/23 18:53:33      Final result by Pedro Deng MD (05/14/23 18:53:33)                   Impression:      No evidence of acute fracture or traumatic malalignment of the cervical spine.    Multilevel cervical spondylosis, noting severe right/moderate left neural foraminal narrowing at C6-C7.    Pulmonary micronodules measuring up to 3 mm in the left upper lobe.  According to Fleischner Society guidelines, solid noncalcified lung nodules less than 6 mm do not require routine follow-up, but certainly patients at high risk with suspicious nodule morphology, upper lobe location, or both may warrant 12 month follow-up.    Additional findings as above.    Electronically signed by resident: Martinez Coates  Date:    05/14/2023  Time:    18:21    Electronically signed by: Pedro Deng MD  Date:    05/14/2023  Time:    18:53               Narrative:    EXAMINATION:  CT CERVICAL SPINE WITHOUT CONTRAST    CLINICAL HISTORY:  Neck trauma (Age >= 65y);    TECHNIQUE:  Axial 1.25 cm CT images of the cervical spine were obtained via helical, multi detector CT technique.  No intravenous contrast material was administered.  Sagittal and coronal reconstructions were provided.    COMPARISON:  Cervical spine radiograph 05/04/2017.    FINDINGS:  Skull Base and Craniocervical Junction (partially imaged): No significant  abnormality.    Spinal Alignment: Exaggerated cervical lordosis.  There is minimal retrolisthesis of C4 on C5.  The remainder of the cervical alignment is unremarkable.    Vertebrae: Vertebral body heights are maintained without evidence for acute fracture.    Discs: Multilevel intervertebral disc space narrowing, most pronounced at C5-C6 and C6-C7.    Degenerative findings: Multilevel cervical spondylosis, most pronounced at C6-C7 noting posterior disc osteophyte complex, bilateral uncovertebral spurring, and bilateral facet arthropathy contributing to severe right/moderate left neural foraminal narrowing.  No significant spinal canal stenosis.    Paraspinal muscles & soft tissues: Scattered atherosclerotic calcifications within the bilateral carotid arteries/bifurcations.  Calcified granulomas in the right lung apex.  Few micronodules in the left lung apex measuring up to 3 mm (axial series 2, image 350).  Multinodular thyroid goiter, the largest hypoattenuating nodule measures up to 1.2 cm in the left lobe of the thyroid.                                       CT Maxillofacial Without Contrast (Final result)  Result time 05/14/23 18:49:56      Final result by Pedro Deng MD (05/14/23 18:49:56)                   Impression:      CT head:    No evidence of acute intracranial abnormality.  Specifically, no evidence of acute intracranial hemorrhage or acute infarct.    Mild generalized cerebral volume loss and chronic microvascular ischemic change.    CT maxillofacial:    No evidence of an acute displaced fracture.    Mild soft tissue swelling overlying the frontal and periorbital calvarium, right greater than left, presumably related to recent reported trauma.    Soft tissue gas overlying the nasal process of the maxilla, in keeping with recent trauma.    Additional findings as above.    Electronically signed by resident: Martinez Coates  Date:    05/14/2023  Time:    18:00    Electronically signed by: Pedro  MD Ish  Date:    05/14/2023  Time:    18:49               Narrative:    EXAMINATION:  CT MAXILLOFACIAL WITHOUT CONTRAST; CT HEAD WITHOUT CONTRAST    CLINICAL HISTORY:  Facial trauma, blunt;; Head trauma, minor (Age >= 65y);    TECHNIQUE:  Low dose CT images throughout the region of the head and facial bones.  Axial, sagittal, and coronal reformations were obtained.  Contrast was not administered.    COMPARISON:  No priors.    FINDINGS:  Head:    Prominence of the ventricles and sulci compatible with mild generalized cerebral volume loss.  No hydrocephalus.    Mild hypoattenuation of the supratentorial white matter while nonspecific may reflect chronic microvascular ischemic change.  No parenchymal mass, hemorrhage or recent or remote major vascular distribution infarct.    No extra-axial blood or fluid collections.    The calvarium appears intact, without evidence of depressed skull fracture.  Mild calcifications at the skull base.    Facial bones:    Subcutaneous soft tissue swelling overlying the frontal calvarium and periorbital regions, right greater than left.  There is soft tissue gas overlying the nasal process of the maxilla.  Postoperative change in the bilateral globes.  The globes and intraorbital contents are otherwise within normal limits.  No orbital fracture.    The remainder of the facial bones appear intact without evidence of an acute displaced fracture.  No osseous destructive lesions.    Temporomandibular joints appropriately position without evidence of dislocation.  Diffuse maxillary periodontal disease.    Trace mucosal thickening of the ethmoid sinuses. Paranasal sinuses are otherwise essentially clear.  Mastoids are clear.  The craniocervical junction appears within normal limits.                                       Medications   Tdap (BOOSTRIX) vaccine injection 0.5 mL (0.5 mLs Intramuscular Given 5/14/23 1812)     Medical Decision Making:   Initial Assessment:   This is an emergent  evaluation.  I will assess for intracranial hemorrhage and skull fracture with a CT scan of the head.  I will assess for cervical spine fracture, subluxation, or dislocation with a cervical CT scan.  I will assess for facial fractures with a CT scan of the face.  X-rays of the knee have been ordered to assess for fracture.  Tetanus will be updated.  The wounds have already been cleaned.  I will reassess.  ED Management:  6:55 p.m.   Right knee x-ray is negative for fracture, dislocation, or subluxation.  CT scan of the head is negative for intracranial hemorrhage or skull fracture.  Maxillofacial CT is negative for acute fracture.  Cervical spine CT is pending.    6:59 p.m.   Cervical spine CT shows no acute injuries.  However, pulmonary micronodules measuring up to 3 mm in the left upper lobe.  According to Fleischner Society guidelines, solid noncalcified lung nodules less than 6 mm do not require routine follow-up, but certainly patients at high risk with suspicious nodule morphology, upper lobe location, or both may warrant 12 month follow-up.    7:07 p.m.  The patient is resting comfortably.  Head injury instructions, instructions on contusions, instructions on abrasions, and instructions on muscle strains have been provided.  The patient has been counseled on the need to follow-up with his primary care physician and to discuss the incidental finding noted on the cervical spine CT.  At this time, I feel that he is clinically stable for discharge.                        Clinical Impression:   Final diagnoses:  [S89.91XA] Right knee injury, initial encounter  [S00.83XA] Facial contusion, initial encounter  [S00.81XA] Facial abrasion, initial encounter  [S09.90XA] Head trauma, initial encounter (Primary)  [S80.01XA] Contusion of right knee, initial encounter  [S16.1XXA] Cervical strain, acute, initial encounter  [R91.1] Pulmonary nodule less than 6 mm determined by computed tomography of lung        ED Disposition  Condition    Discharge Stable          ED Prescriptions    None       Follow-up Information       Follow up With Specialties Details Why Contact Info    Davide Parker MD Pulmonary Disease In 3 days If symptoms persistworsen. Also, discuss the incidental finding noted on your CAT Scan. [address]  [facility] [address]  [phone]               Herb Summers MD  05/14/23 9743

## 2024-01-11 NOTE — PROGRESS NOTES
Subjective:      Patient ID: Rip Atnhony is a 78 y.o. male.    Chief Complaint:Travel Consult      History of Present Illness  Travel Consult  Chief Complaint   Patient presents with    Travel Consult     Rip Anthony is here for travel consultation.  He is traveling to [place] initially and then to [facility] and [facility] with his family.  Arriving in [facility] [place] on 1/19 through 1/25, then traveling to [facility] 1/25 to 1/30.  They will be on safari trips (higher end with well-equipped accommodations).    [place]    Accommodations: [employer]  Purpose of travel: vacation  Currently ill / Fever: no  History of Splenectomy: no  The patient states that he does not live with a household member that has cancer, HIV infection, or take drugs to suppress the immune system.    Recent COVID vaccine.   Believes he may have had Yellow Fever vaccine remotely  Usual childhood vaccines  Denies any kidney or liver dysfunction      Past Medical History:   Diagnosis Date    Mixed hyperlipidemia 5/4/2017    Retina disorder      No known allergies  Immunization History   Administered Date(s) Administered    COVID-19, MRNA, LN-S, PF (Pfizer) (Gray Cap) 04/08/2022    COVID-19, MRNA, LN-S, PF (Pfizer) (Purple Cap) 01/04/2021, 01/25/2021, 10/04/2021    COVID-19, mRNA, LNP-S, PF (Moderna 2023)Ages 12+ 01/09/2024    COVID-19, mRNA, LNP-S, bivalent booster, PF (PFIZER OMICRON) 09/28/2022    Hepatitis A 03/19/2014    Hepatitis A, Adult 01/12/2024    Influenza (FLUAD) - Quadrivalent - Adjuvanted - PF *Preferred* (65+) 11/24/2020, 11/10/2023    Influenza - High Dose - PF (65 years and older) 11/10/2015, 12/01/2016    Influenza - Quadrivalent 12/10/2014    Influenza Split 01/09/2013, 01/08/2014    Pneumococcal Conjugate - 13 Valent 11/24/2020    Tdap 03/19/2014, 01/26/2017, 05/14/2023    Typhoid - ViCPs 01/12/2024    Zoster Recombinant 11/09/2021, 02/08/2022    meningococcal Conjugate (MCV4O) 01/12/2024     Review of Systems    Constitutional: Negative for chills, decreased appetite, fever, malaise/fatigue, night sweats, weight gain and weight loss.   HENT:  Negative for congestion, ear pain, hearing loss, hoarse voice, sore throat and tinnitus.    Eyes:  Negative for blurred vision, redness and visual disturbance.   Cardiovascular:  Negative for chest pain, leg swelling and palpitations.   Respiratory:  Negative for cough, hemoptysis, shortness of breath, sputum production and wheezing.    Hematologic/Lymphatic: Negative for adenopathy. Does not bruise/bleed easily.   Skin:  Negative for dry skin, itching, rash and suspicious lesions.   Musculoskeletal:  Negative for back pain, joint pain, myalgias and neck pain.   Gastrointestinal:  Negative for abdominal pain, constipation, diarrhea, heartburn, nausea and vomiting.   Genitourinary:  Negative for dysuria, flank pain, frequency, hematuria, hesitancy and urgency.   Neurological:  Negative for dizziness, headaches, numbness, paresthesias and weakness.   Psychiatric/Behavioral:  Negative for depression and memory loss. The patient does not have insomnia and is not nervous/anxious.    Allergic/Immunologic: Negative for environmental allergies, HIV exposure, hives and persistent infections.     Objective:   Physical Exam  Vitals reviewed.   Constitutional:       General: He is not in acute distress.     Appearance: Normal appearance. He is normal weight. He is not ill-appearing, toxic-appearing or diaphoretic.   HENT:      Head: Normocephalic and atraumatic.      Mouth/Throat:      Mouth: Mucous membranes are moist.   Eyes:      General: No scleral icterus.     Conjunctiva/sclera: Conjunctivae normal.   Cardiovascular:      Rate and Rhythm: Normal rate and regular rhythm.   Pulmonary:      Effort: Pulmonary effort is normal. No respiratory distress.   Musculoskeletal:      Cervical back: Normal range of motion.      Right lower leg: No edema.      Left lower leg: No edema.   Skin:      General: Skin is warm and dry.   Neurological:      Mental Status: He is alert and oriented to person, place, and time.   Psychiatric:         Mood and Affect: Mood normal.         Behavior: Behavior normal.         Thought Content: Thought content normal.         Judgment: Judgment normal.       Assessment:     1. Counseling about travel    2. Vaccine counseling    3. Need for immunization against typhoid    4. Need for hepatitis A immunization    5. Need for malaria prophylaxis    6. Traveler's diarrhea    7. Need for meningococcal vaccination    8. Polio vaccine needed        Plan:     The Patient was provided with an extensive travel guidance packet which provides travel information specific to the patients itinerary.  Reviewed with patient in office.   The patient's medical history was reviewed and the patient was counseled on:  Dietary precautions.  Personal protective measures to prevent insect-borne diseases (e.g., malaria, dengue).  Precautions to prevent exposure to rabies and seek treatment for possible exposures.  Precautions against sun exposure.  Precautions against development of DVT during flight.  Personal and travel safety.  The patient's immunization history was reviewed and, based on the patient's itinerary, immunizations were ordered:  Hepatitis A, Typhoid, meningitis, polio booster.  Counseled on risks/benefits of YF vaccine and increased risk with advanced age.  Yellow Fever vaccine deferred because of advanced age.  Yellow fever waiver/letter provided to patient.   The patient was encouraged to contact us about any problems that may develop after immunization and possible side effects were reviewed.    The patient was instructed to purchase Imodium over the counter to take in case diarrhea (without blood or fever) develops.  An antibiotic, Azithromycin,  was ordered for treatment if severe or bloody diarrhea develops and the patient was instructed on use and possible side effects.    The  patient was also instructed to purchase insect repellent containing DEET or [place] and apply according to repellent label instructions.  If indicated by the patients itinerary an anti-malarial agent, malarone, was prescribed for malaria prophylaxis and possible side effects were reviewed.    The patient was instructed to contact us if problems develop after travel.         45 minutes of total time was spent on this encounter, which includes face to face time and non-face to face time preparing to see the patient (eg, review of tests), Obtaining and/or reviewing separately obtained history, documenting clinical information in the electronic or other health record, independently interpreting results (not separately reported) and communicating results to the patient/family/caregiver, or care coordination (not separately reported).

## 2024-01-12 ENCOUNTER — OFFICE VISIT (OUTPATIENT)
Dept: INFECTIOUS DISEASES | Facility: CLINIC | Age: 79
End: 2024-01-12
Payer: COMMERCIAL

## 2024-01-12 ENCOUNTER — CLINICAL SUPPORT (OUTPATIENT)
Dept: INFECTIOUS DISEASES | Facility: CLINIC | Age: 79
End: 2024-01-12
Payer: COMMERCIAL

## 2024-01-12 VITALS
HEART RATE: 62 BPM | SYSTOLIC BLOOD PRESSURE: 136 MMHG | TEMPERATURE: 98 F | HEIGHT: 73 IN | WEIGHT: 192.88 LBS | DIASTOLIC BLOOD PRESSURE: 73 MMHG | BODY MASS INDEX: 25.56 KG/M2

## 2024-01-12 DIAGNOSIS — Z23 NEED FOR IMMUNIZATION AGAINST TYPHOID: ICD-10-CM

## 2024-01-12 DIAGNOSIS — Z29.89 NEED FOR MALARIA PROPHYLAXIS: ICD-10-CM

## 2024-01-12 DIAGNOSIS — Z23 NEED FOR HEPATITIS A IMMUNIZATION: ICD-10-CM

## 2024-01-12 DIAGNOSIS — Z71.84 COUNSELING ABOUT TRAVEL: Primary | ICD-10-CM

## 2024-01-12 DIAGNOSIS — Z23 POLIO VACCINE NEEDED: ICD-10-CM

## 2024-01-12 DIAGNOSIS — Z71.85 VACCINE COUNSELING: ICD-10-CM

## 2024-01-12 DIAGNOSIS — Z23 NEED FOR MENINGOCOCCAL VACCINATION: ICD-10-CM

## 2024-01-12 DIAGNOSIS — A09 TRAVELER'S DIARRHEA: ICD-10-CM

## 2024-01-12 PROCEDURE — 3288F FALL RISK ASSESSMENT DOCD: CPT | Mod: CPTII,S$GLB,, | Performed by: NURSE PRACTITIONER

## 2024-01-12 PROCEDURE — 90471 IMMUNIZATION ADMIN: CPT | Mod: S$GLB,,, | Performed by: INTERNAL MEDICINE

## 2024-01-12 PROCEDURE — 90472 IMMUNIZATION ADMIN EACH ADD: CPT | Mod: S$GLB,,, | Performed by: INTERNAL MEDICINE

## 2024-01-12 PROCEDURE — 99999 PR PBB SHADOW E&M-EST. PATIENT-LVL I: CPT | Mod: PBBFAC,,,

## 2024-01-12 PROCEDURE — 3075F SYST BP GE 130 - 139MM HG: CPT | Mod: CPTII,S$GLB,, | Performed by: NURSE PRACTITIONER

## 2024-01-12 PROCEDURE — 90734 MENACWYD/MENACWYCRM VACC IM: CPT | Mod: S$GLB,,, | Performed by: INTERNAL MEDICINE

## 2024-01-12 PROCEDURE — 1126F AMNT PAIN NOTED NONE PRSNT: CPT | Mod: CPTII,S$GLB,, | Performed by: NURSE PRACTITIONER

## 2024-01-12 PROCEDURE — 99999 PR PBB SHADOW E&M-EST. PATIENT-LVL V: CPT | Mod: PBBFAC,,, | Performed by: NURSE PRACTITIONER

## 2024-01-12 PROCEDURE — 1160F RVW MEDS BY RX/DR IN RCRD: CPT | Mod: CPTII,S$GLB,, | Performed by: NURSE PRACTITIONER

## 2024-01-12 PROCEDURE — 1101F PT FALLS ASSESS-DOCD LE1/YR: CPT | Mod: CPTII,S$GLB,, | Performed by: NURSE PRACTITIONER

## 2024-01-12 PROCEDURE — 3078F DIAST BP <80 MM HG: CPT | Mod: CPTII,S$GLB,, | Performed by: NURSE PRACTITIONER

## 2024-01-12 PROCEDURE — 1159F MED LIST DOCD IN RCRD: CPT | Mod: CPTII,S$GLB,, | Performed by: NURSE PRACTITIONER

## 2024-01-12 PROCEDURE — 99403 PREV MED CNSL INDIV APPRX 45: CPT | Mod: S$GLB,,, | Performed by: NURSE PRACTITIONER

## 2024-01-12 PROCEDURE — 90691 TYPHOID VACCINE IM: CPT | Mod: S$GLB,,, | Performed by: INTERNAL MEDICINE

## 2024-01-12 PROCEDURE — 90632 HEPA VACCINE ADULT IM: CPT | Mod: S$GLB,,, | Performed by: INTERNAL MEDICINE

## 2024-01-12 RX ORDER — ATOVAQUONE AND PROGUANIL HYDROCHLORIDE 250; 100 MG/1; MG/1
TABLET, FILM COATED ORAL
Qty: 25 TABLET | Refills: 0 | Status: SHIPPED | OUTPATIENT
Start: 2024-01-12

## 2024-01-12 RX ORDER — AZITHROMYCIN 500 MG/1
500 TABLET, FILM COATED ORAL DAILY
Qty: 3 TABLET | Refills: 0 | Status: SHIPPED | OUTPATIENT
Start: 2024-01-12 | End: 2024-01-15

## 2024-01-12 RX ORDER — AZITHROMYCIN 500 MG/1
500 TABLET, FILM COATED ORAL DAILY
Qty: 3 TABLET | Refills: 0 | Status: CANCELLED | OUTPATIENT
Start: 2024-01-12 | End: 2024-01-15

## 2024-01-12 RX ORDER — ATOVAQUONE AND PROGUANIL HYDROCHLORIDE 250; 100 MG/1; MG/1
TABLET, FILM COATED ORAL
Status: CANCELLED | OUTPATIENT
Start: 2024-01-12

## 2024-01-12 NOTE — PROGRESS NOTES
Patient received the Typhoid and the Menveo vaccines in the left deltoid, and the Hep A #1 in the right deltoid. Pt tolerated well. Pt asked to wait in the clinic 15 minutes after injection in the event of an allergic reaction. Pt verbalized understanding. Pt left in NAD.

## 2024-01-12 NOTE — PATIENT INSTRUCTIONS
Vaccines today:      Hepatitis A (will be good for life), Polio, Meningitis, Typhoid   The most common adverse effects of any vaccine is pain, soreness, mild swelling at injection site.  Occasionally may experience low grade fevers, body aches.   Before you travel:  Prepare a travel health kit- any over the counter medications you may need including but not limited to:  First Aid Kit  Diarrhea medicine (Imodium or Pepto-Bismol)  Antacid to use as needed  Antihistamine  Motion sickness medicine  Cough drops, cough suppressant, or expectorant  Decongestant  Pain and fever medicine (acetaminophen, aspirin, or ibuprofen)  2. Take insect repellent with DEET or Picaridin.  3. Take sunscreen SPF 15 or higher and use according to label instructions.  4. Take aloe products in case you develop sunburns.  5. Take alcohol-based hand sanitizers.  6. Take water disinfection tablets.     During your travel:  Avoid insect bites by:  Using insect repellent with DEET or Picaridin.  Recommend DEET 25-35%.   Higher strengths last longer, but  >35% can damage synthetic materials  Wear long sleeved shirts and long pants  Treat clothing and gear with permethrin 0.5%  Choose a hotel or lodging with air conditioning, or windows and door screens  Sleep under a mosquito net if you cannot stay in a place with air conditioning, or without screens and if you are staying outdoors  Prevent tick bites by:  Avoiding grassy or wooded areas or animals  Treat clothing and gear with permethrin 0/5%  Use EPA registered insect repellents  Avoid contact with ticks  3. Perform tick checks and remove them if found  4. If you are prescribed Malarone for malaria prevention -  Take one pill daily.  Start 1-2 days prior to entering affected area, continue daily during travel, and continue for 7 days after leaving affected area  5. Traveler's diarrhea  - buy hand  and use prior to eating and after using the bathroom  - bring imodium with you (available  over the counter). If diarrhea is severe, lasting longer than 4 days or with associated fever or blood in stool, take the antibiotic,  Azithromycin, as we discussed.   -  Dehydration is the most likely complication of traveler's diarrhea, so it's important to try to stay well hydrated.  An oral rehydration salts (ORS) solution is the best way to replace lost fluids. These solutions contain water and salts in specific proportions to replenish both fluids and electrolytes. They also contain glucose to enhance absorption in the intestinal tract.  Bottled oral rehydration products are available in drugstores in developed areas, and many pharmacies carry their own brands. You can find packets of powdered oral rehydration salts, labeled World Health Organization (WHO)-ORS, at stores, pharmacies and health agencies in most countries. Reconstitute the powder in bottled or boiled water according to the directions on the package.      If these products are unavailable, you can prepare your own rehydrating solution in an emergency by mixing together your own rehydration solution:  Six (6) level teaspoons of Sugar.  Half (1/2) level teaspoon of Salt.  One Litre of clean drinking or boiled water and then cooled - 5 cupfuls (each cup about 200 ml.)       After you travel:  Ensure you finish any remaining medication such as antimalarial medications.   If you are not feeling well, please contact the office immediately to evaluate your symptoms.   You may be asked to remember any travel activities, where you stayed (hotel, family home, etc), what your ate and drank, any animal contacts, injuries or bug bites.

## 2024-03-13 NOTE — PROGRESS NOTES
Subjective:       Patient ID: Rip Anthony is a 78 y.o. male.    Chief Complaint: Pre-op Exam and Establish Care    HPI    Rip Anthony is a 78 y.o. male to [facility] care and for preop    Upcoming surgery w/ Dr. Christian 4/18/24 for eyelid procedure    #Cards: HLD  - reg: Lipitor 20 qd; ASA qd    #Ophtho: Glaucoma    Review of Systems   Constitutional:  Negative for chills, fatigue and fever.   HENT:  Negative for congestion.    Respiratory:  Negative for cough and shortness of breath.    Cardiovascular:  Negative for chest pain and palpitations.   Gastrointestinal:  Negative for abdominal pain, constipation, diarrhea, nausea and vomiting.   Genitourinary:  Negative for dysuria and hematuria.   Musculoskeletal:  Negative for back pain.   Skin:  Negative for rash.   Neurological:  Negative for weakness, numbness and headaches.         Past Medical History:   Diagnosis Date    Mixed hyperlipidemia 5/4/2017    Retina disorder         Prior to Admission medications    Medication Sig Start Date End Date Taking? Authorizing Provider   aspirin (ECOTRIN) 81 MG EC tablet Take by mouth. 12/14/11   Provider, Historical   atorvastatin (LIPITOR) 20 MG tablet TAKE ONE TABLET BY MOUTH AT BEDTIME for cholesterol lowering/control 8/18/22   Daivde Parker MD   atorvastatin (LIPITOR) 20 MG tablet Take 1 tablet (20 mg total) by mouth every evening.  Patient not taking: Reported on 1/12/2024 8/18/22   Davide Parker MD   atorvastatin (LIPITOR) 20 MG tablet Take 1 tablet Orally Once a day 90 days  Patient not taking: Reported on 1/12/2024 11/8/23      atovaquone-proguaniL (MALARONE) 250-100 mg Tab Take one tablet daily for malaria prevention. Begin one day before entering malarious area and continue for 1 week after return. 1/12/24   Natasha Paulino, APRN, ANP   azithromycin (ZITHROMAX Z-GRISELDA) 250 MG tablet 2 tablets on the 1st day and then 1 tablet for 4 days Orally Once a day 5 days 1/12/24      brimonidine 0.1% (ALPHAGAN P)  "0.1 % Drop Inject into the eye. 12/14/11   Provider, Historical   brimonidine 0.1% (ALPHAGAN P) 0.1 % Drop instill 1 drop into both eyes three times a day 11/22/23      brimonidine 0.1% (ALPHAGAN P) 0.1 % Drop Instill 1 drop into both eyes three times a day 11/22/23      ciprofloxacin HCl (CIPRO) 500 MG tablet Take 1 tablet (500 mg total) by mouth 2 (two) times daily.  Patient not taking: Reported on 1/12/2024 6/24/21   Davide Parker MD   COSOPT, PF, 2-0.5 % Dpet ophthalmic solution Place 1 drop into both eyes 2 (two) times daily. 5/28/19   Provider, Historical   dorzolamide-timolol, PF, (COSOPT PF) 2-0.5 % Dpet ophthalmic solution Instill 1 drop into each eye twice daily 11/28/22   Enrique Messer MD   dorzolamide-timolol, PF, (COSOPT PF) 2-0.5 % Dpet ophthalmic solution one drop twice a day in both eyes 1/11/24      dorzolamide-timolol, PF, (COSOPT, PF,) 2-0.5 % Dpet ophthalmic solution Apply 1 drop Both Eyes twice a day 7/10/23      dorzolamide-timolol, PF, (COSOPT, PF,) 2-0.5 % Dpet ophthalmic solution Instill ONE DROP IN EACH EYE TWICE DAILY 10/2/23      metFORMIN (GLUCOPHAGE-XR) 500 MG ER 24hr tablet Take 1 tablet (500 mg total) by mouth 2 (two) times a day.  Patient not taking: Reported on 1/12/2024 11/21/22           Past medical history, surgical history, and family medical history reviewed and updated as appropriate.    Medications and allergies reviewed.     Objective:          Vitals:    03/28/24 1305   BP: 136/80   BP Location: Left arm   Patient Position: Sitting   BP Method: Small (Manual)   Pulse: (!) 53   SpO2: 99%   Weight: 84.9 kg (187 lb 2.7 oz)   Height: 6' 1" (1.854 m)     Body mass index is 24.69 kg/m².  Physical Exam  Vitals and nursing note reviewed.   Constitutional:       General: He is not in acute distress.     Appearance: Normal appearance. He is well-developed.   Eyes:      Extraocular Movements: Extraocular movements intact.   Cardiovascular:      Rate and Rhythm: Normal " rate and regular rhythm.      Pulses: Normal pulses.      Heart sounds: Normal heart sounds. No murmur heard.  Pulmonary:      Effort: Pulmonary effort is normal. No respiratory distress.      Breath sounds: Normal breath sounds. No wheezing.   Abdominal:      General: Bowel sounds are normal.      Palpations: Abdomen is soft.      Tenderness: There is no abdominal tenderness.   Neurological:      Mental Status: He is alert and oriented to person, place, and time.         Lab Results   Component Value Date    WBC 6.71 06/29/2022    HGB 15.8 06/29/2022    HCT 48.7 06/29/2022     06/29/2022    CHOL 148 06/29/2022    TRIG 62 06/29/2022    HDL 52 06/29/2022    ALT 15 06/29/2022    AST 15 06/29/2022     06/29/2022    K 4.3 06/29/2022     06/29/2022    CREATININE 1.0 06/29/2022    BUN 11 06/29/2022    CO2 29 06/29/2022    TSH 1.963 06/29/2022    PSA 4.8 (H) 06/29/2022    HGBA1C 5.3 06/29/2022       Assessment:       1. Preoperative clearance    2. Annual physical exam    3. Encounter to establish care with new doctor    4. Prostate cancer screening    5. Mixed hyperlipidemia          Plan:   1. Preoperative clearance  -     CBC Auto Differential; Future; Expected date: 03/28/2024  -     Protime-INR; Future; Expected date: 03/28/2024  -     APTT; Future; Expected date: 03/28/2024  -     EKG 12-lead    2. Annual physical exam  -     Comprehensive Metabolic Panel; Future; Expected date: 03/28/2024  -     Lipid Panel; Future; Expected date: 03/28/2024  -     Hemoglobin A1C; Future; Expected date: 03/28/2024  -     PSA, Screening; Future; Expected date: 03/28/2024    3. Encounter to establish care with new doctor    4. Prostate cancer screening  -     PSA, Screening; Future; Expected date: 03/28/2024    5. Mixed hyperlipidemia  -     atorvastatin (LIPITOR) 20 MG tablet; Take 1 tablet (20 mg total) by mouth once daily.  Dispense: 90 tablet; Refill: 3        Health maintenance reviewed with patient.     Total  time spent face-to face with patient counseling or coordinating care including prognosis, risks and benefits of treatment, instructions as well as non-face-to-face time personally spent reviewing medial record, medical documentation, and coordination of care is 45 minutes.      No follow-ups on file.    Rohit Leonardo MD  Family Medicine / Primary Care  [facility]  3/28/2024

## 2024-03-28 ENCOUNTER — OFFICE VISIT (OUTPATIENT)
Dept: INTERNAL MEDICINE | Facility: CLINIC | Age: 79
End: 2024-03-28
Payer: COMMERCIAL

## 2024-03-28 ENCOUNTER — LAB VISIT (OUTPATIENT)
Dept: LAB | Facility: HOSPITAL | Age: 79
End: 2024-03-28
Attending: FAMILY MEDICINE
Payer: COMMERCIAL

## 2024-03-28 VITALS
OXYGEN SATURATION: 99 % | SYSTOLIC BLOOD PRESSURE: 136 MMHG | BODY MASS INDEX: 24.81 KG/M2 | WEIGHT: 187.19 LBS | HEART RATE: 53 BPM | HEIGHT: 73 IN | DIASTOLIC BLOOD PRESSURE: 80 MMHG

## 2024-03-28 DIAGNOSIS — Z12.5 PROSTATE CANCER SCREENING: ICD-10-CM

## 2024-03-28 DIAGNOSIS — Z01.818 PREOPERATIVE CLEARANCE: Primary | ICD-10-CM

## 2024-03-28 DIAGNOSIS — E78.2 MIXED HYPERLIPIDEMIA: ICD-10-CM

## 2024-03-28 DIAGNOSIS — Z01.818 PREOPERATIVE CLEARANCE: ICD-10-CM

## 2024-03-28 DIAGNOSIS — Z76.89 ENCOUNTER TO ESTABLISH CARE WITH NEW DOCTOR: ICD-10-CM

## 2024-03-28 DIAGNOSIS — Z00.00 ANNUAL PHYSICAL EXAM: ICD-10-CM

## 2024-03-28 LAB
ALBUMIN SERPL BCP-MCNC: 4 G/DL (ref 3.5–5.2)
ALP SERPL-CCNC: 49 U/L (ref 55–135)
ALT SERPL W/O P-5'-P-CCNC: 14 U/L (ref 10–44)
ANION GAP SERPL CALC-SCNC: 6 MMOL/L (ref 8–16)
AST SERPL-CCNC: 18 U/L (ref 10–40)
BILIRUB SERPL-MCNC: 1.1 MG/DL (ref 0.1–1)
BUN SERPL-MCNC: 10 MG/DL (ref 8–23)
CALCIUM SERPL-MCNC: 9.8 MG/DL (ref 8.7–10.5)
CHLORIDE SERPL-SCNC: 106 MMOL/L (ref 95–110)
CHOLEST SERPL-MCNC: 167 MG/DL (ref 120–199)
CHOLEST/HDLC SERPL: 2.7 {RATIO} (ref 2–5)
CO2 SERPL-SCNC: 26 MMOL/L (ref 23–29)
COMPLEXED PSA SERPL-MCNC: 5.9 NG/ML (ref 0–4)
CREAT SERPL-MCNC: 1 MG/DL (ref 0.5–1.4)
EST. GFR  (NO RACE VARIABLE): >60 ML/MIN/1.73 M^2
GLUCOSE SERPL-MCNC: 102 MG/DL (ref 70–110)
HDLC SERPL-MCNC: 61 MG/DL (ref 40–75)
HDLC SERPL: 36.5 % (ref 20–50)
INR PPP: 1 (ref 0.8–1.2)
LDLC SERPL CALC-MCNC: 93.4 MG/DL (ref 63–159)
NONHDLC SERPL-MCNC: 106 MG/DL
OHS QRS DURATION: 94 MS
OHS QTC CALCULATION: 406 MS
POTASSIUM SERPL-SCNC: 4.5 MMOL/L (ref 3.5–5.1)
PROT SERPL-MCNC: 6.6 G/DL (ref 6–8.4)
PROTHROMBIN TIME: 11.1 SEC (ref 9–12.5)
SODIUM SERPL-SCNC: 138 MMOL/L (ref 136–145)
TRIGL SERPL-MCNC: 63 MG/DL (ref 30–150)

## 2024-03-28 PROCEDURE — 93010 ELECTROCARDIOGRAM REPORT: CPT | Mod: S$GLB,,, | Performed by: INTERNAL MEDICINE

## 2024-03-28 PROCEDURE — 80061 LIPID PANEL: CPT | Performed by: FAMILY MEDICINE

## 2024-03-28 PROCEDURE — 99215 OFFICE O/P EST HI 40 MIN: CPT | Mod: S$GLB,,, | Performed by: FAMILY MEDICINE

## 2024-03-28 PROCEDURE — 84153 ASSAY OF PSA TOTAL: CPT | Performed by: FAMILY MEDICINE

## 2024-03-28 PROCEDURE — 3075F SYST BP GE 130 - 139MM HG: CPT | Mod: CPTII,S$GLB,, | Performed by: FAMILY MEDICINE

## 2024-03-28 PROCEDURE — 36415 COLL VENOUS BLD VENIPUNCTURE: CPT | Performed by: FAMILY MEDICINE

## 2024-03-28 PROCEDURE — 1101F PT FALLS ASSESS-DOCD LE1/YR: CPT | Mod: CPTII,S$GLB,, | Performed by: FAMILY MEDICINE

## 2024-03-28 PROCEDURE — 1160F RVW MEDS BY RX/DR IN RCRD: CPT | Mod: CPTII,S$GLB,, | Performed by: FAMILY MEDICINE

## 2024-03-28 PROCEDURE — 99999 PR PBB SHADOW E&M-EST. PATIENT-LVL III: CPT | Mod: PBBFAC,,, | Performed by: FAMILY MEDICINE

## 2024-03-28 PROCEDURE — 3288F FALL RISK ASSESSMENT DOCD: CPT | Mod: CPTII,S$GLB,, | Performed by: FAMILY MEDICINE

## 2024-03-28 PROCEDURE — 85025 COMPLETE CBC W/AUTO DIFF WBC: CPT | Performed by: FAMILY MEDICINE

## 2024-03-28 PROCEDURE — 93005 ELECTROCARDIOGRAM TRACING: CPT | Mod: S$GLB,,, | Performed by: FAMILY MEDICINE

## 2024-03-28 PROCEDURE — 3079F DIAST BP 80-89 MM HG: CPT | Mod: CPTII,S$GLB,, | Performed by: FAMILY MEDICINE

## 2024-03-28 PROCEDURE — 1126F AMNT PAIN NOTED NONE PRSNT: CPT | Mod: CPTII,S$GLB,, | Performed by: FAMILY MEDICINE

## 2024-03-28 PROCEDURE — 85610 PROTHROMBIN TIME: CPT | Performed by: FAMILY MEDICINE

## 2024-03-28 PROCEDURE — 1159F MED LIST DOCD IN RCRD: CPT | Mod: CPTII,S$GLB,, | Performed by: FAMILY MEDICINE

## 2024-03-28 PROCEDURE — 80053 COMPREHEN METABOLIC PANEL: CPT | Performed by: FAMILY MEDICINE

## 2024-03-28 PROCEDURE — 83036 HEMOGLOBIN GLYCOSYLATED A1C: CPT | Performed by: FAMILY MEDICINE

## 2024-03-28 RX ORDER — ATORVASTATIN CALCIUM 20 MG/1
20 TABLET, FILM COATED ORAL DAILY
Qty: 90 TABLET | Refills: 3 | Status: SHIPPED | OUTPATIENT
Start: 2024-03-28

## 2024-03-29 LAB
BASOPHILS # BLD AUTO: 0.05 K/UL (ref 0–0.2)
BASOPHILS NFR BLD: 0.8 % (ref 0–1.9)
DIFFERENTIAL METHOD BLD: ABNORMAL
EOSINOPHIL # BLD AUTO: 0.1 K/UL (ref 0–0.5)
EOSINOPHIL NFR BLD: 1.4 % (ref 0–8)
ERYTHROCYTE [DISTWIDTH] IN BLOOD BY AUTOMATED COUNT: 13.5 % (ref 11.5–14.5)
ESTIMATED AVG GLUCOSE: 105 MG/DL (ref 68–131)
HBA1C MFR BLD: 5.3 % (ref 4–5.6)
HCT VFR BLD AUTO: 49.6 % (ref 40–54)
HGB BLD-MCNC: 15.8 G/DL (ref 14–18)
IMM GRANULOCYTES # BLD AUTO: 0.01 K/UL (ref 0–0.04)
IMM GRANULOCYTES NFR BLD AUTO: 0.2 % (ref 0–0.5)
LYMPHOCYTES # BLD AUTO: 1.7 K/UL (ref 1–4.8)
LYMPHOCYTES NFR BLD: 26.9 % (ref 18–48)
MCH RBC QN AUTO: 30.5 PG (ref 27–31)
MCHC RBC AUTO-ENTMCNC: 31.9 G/DL (ref 32–36)
MCV RBC AUTO: 96 FL (ref 82–98)
MONOCYTES # BLD AUTO: 0.5 K/UL (ref 0.3–1)
MONOCYTES NFR BLD: 7.9 % (ref 4–15)
NEUTROPHILS # BLD AUTO: 3.9 K/UL (ref 1.8–7.7)
NEUTROPHILS NFR BLD: 62.8 % (ref 38–73)
NRBC BLD-RTO: 0 /100 WBC
PLATELET # BLD AUTO: 237 K/UL (ref 150–450)
PMV BLD AUTO: 10.1 FL (ref 9.2–12.9)
RBC # BLD AUTO: 5.18 M/UL (ref 4.6–6.2)
WBC # BLD AUTO: 6.24 K/UL (ref 3.9–12.7)

## 2024-04-01 ENCOUNTER — TELEPHONE (OUTPATIENT)
Dept: INTERNAL MEDICINE | Facility: CLINIC | Age: 79
End: 2024-04-01
Payer: COMMERCIAL

## 2024-04-01 DIAGNOSIS — Z01.818 PREOPERATIVE CLEARANCE: Primary | ICD-10-CM

## 2024-04-01 DIAGNOSIS — R79.1 ABNORMAL COAGULATION PROFILE: ICD-10-CM

## 2024-04-01 DIAGNOSIS — Z79.899 ENCOUNTER FOR LONG-TERM (CURRENT) USE OF OTHER MEDICATIONS: ICD-10-CM

## 2024-04-01 NOTE — TELEPHONE ENCOUNTER
----- Message from Rosi Irizarry sent at 4/1/2024 12:08 AM CDT -----  Regarding: Lab Client Services  Contact: [phone]  Good Morning,     My name is Rosi Irizarry, I work in the Lab Client Services. We had a problem with some lab work on this patient. If someone from your office could call us at [phone] or ext. [phone] that would be great. Anyone in my department can help.      Thank you,

## 2024-04-02 ENCOUNTER — TELEPHONE (OUTPATIENT)
Dept: INTERNAL MEDICINE | Facility: CLINIC | Age: 79
End: 2024-04-02
Payer: COMMERCIAL

## 2024-04-02 NOTE — TELEPHONE ENCOUNTER
----- Message from Skylar Carver sent at 4/2/2024 10:43 AM CDT -----  Contact: wife Simeon   Wife Simeon would like a call back. Rip has an appt tomorrow for lab work  She said he had lab work done on 03/28/24 & would like to know if he still needs this appt

## 2024-04-02 NOTE — TELEPHONE ENCOUNTER
Called pt's wife back and spoke on the phone -- let her know that the lab work that was done on the 28th were a different set of labs compared to the lab work from tomorrow -- pt needed to reschedule so we moved it to 2pm on 4/4

## 2024-04-03 ENCOUNTER — PATIENT MESSAGE (OUTPATIENT)
Dept: INTERNAL MEDICINE | Facility: CLINIC | Age: 79
End: 2024-04-03
Payer: COMMERCIAL

## 2024-04-04 ENCOUNTER — LAB VISIT (OUTPATIENT)
Dept: LAB | Facility: HOSPITAL | Age: 79
End: 2024-04-04
Attending: FAMILY MEDICINE
Payer: COMMERCIAL

## 2024-04-04 ENCOUNTER — TELEPHONE (OUTPATIENT)
Dept: INTERNAL MEDICINE | Facility: CLINIC | Age: 79
End: 2024-04-04
Payer: COMMERCIAL

## 2024-04-04 DIAGNOSIS — Z01.818 PREOPERATIVE CLEARANCE: ICD-10-CM

## 2024-04-04 DIAGNOSIS — Z79.899 ENCOUNTER FOR LONG-TERM (CURRENT) USE OF OTHER MEDICATIONS: ICD-10-CM

## 2024-04-04 DIAGNOSIS — R79.1 ABNORMAL COAGULATION PROFILE: ICD-10-CM

## 2024-04-04 LAB — APTT PPP: 26.8 SEC (ref 21–32)

## 2024-04-04 PROCEDURE — 85730 THROMBOPLASTIN TIME PARTIAL: CPT | Performed by: FAMILY MEDICINE

## 2024-04-04 PROCEDURE — 36415 COLL VENOUS BLD VENIPUNCTURE: CPT | Performed by: FAMILY MEDICINE

## 2024-04-04 NOTE — TELEPHONE ENCOUNTER
----- Message from Rohit Leonardo MD sent at 4/4/2024  9:32 AM CDT -----  Re-send EKG. Send the actual image that I printed out and then the result that is listed in the chart too in case that was the issue    I'll give you paperwork back. You don't have to send anything else  Fax is [phone]

## 2024-04-17 NOTE — ASU PATIENT PROFILE, ADULT - NSICDXPASTSURGICALHX_GEN_ALL_CORE_FT
PAST SURGICAL HISTORY:  Cataracts, bilateral     H/O detached retina repair     History of back surgery

## 2024-04-17 NOTE — ASU PATIENT PROFILE, ADULT - NS PREOP UNDERSTANDS INFO
NPO after midnight solids, clear fluid (water clear apple juice) till 9am DOS escort required for discharge home, bring picture ID and insurance info.

## 2024-04-17 NOTE — ASU PATIENT PROFILE, ADULT - ABILITY TO HEAR (WITH HEARING AID OR HEARING APPLIANCE IF NORMALLY USED):
bilat H /A/Mildly to Moderately Impaired: difficulty hearing in some environments or speaker may need to increase volume or speak distinctly

## 2024-04-18 ENCOUNTER — OUTPATIENT (OUTPATIENT)
Dept: OUTPATIENT SERVICES | Facility: HOSPITAL | Age: 79
LOS: 1 days | Discharge: HOME | End: 2024-04-18
Payer: COMMERCIAL

## 2024-04-18 VITALS
OXYGEN SATURATION: 96 % | TEMPERATURE: 97 F | RESPIRATION RATE: 16 BRPM | SYSTOLIC BLOOD PRESSURE: 124 MMHG | HEART RATE: 56 BPM | DIASTOLIC BLOOD PRESSURE: 63 MMHG

## 2024-04-18 VITALS
RESPIRATION RATE: 16 BRPM | WEIGHT: 181.44 LBS | HEART RATE: 67 BPM | DIASTOLIC BLOOD PRESSURE: 52 MMHG | OXYGEN SATURATION: 98 % | HEIGHT: 72 IN | TEMPERATURE: 99 F | SYSTOLIC BLOOD PRESSURE: 128 MMHG

## 2024-04-18 PROCEDURE — 88304 TISSUE EXAM BY PATHOLOGIST: CPT | Mod: 26

## 2024-04-18 RX ORDER — ACETAMINOPHEN 500 MG
650 TABLET ORAL EVERY 6 HOURS
Refills: 0 | Status: DISCONTINUED | OUTPATIENT
Start: 2024-04-18 | End: 2024-04-18

## 2024-04-18 RX ORDER — ATORVASTATIN CALCIUM 80 MG/1
0 TABLET, FILM COATED ORAL
Refills: 0 | DISCHARGE

## 2024-04-18 RX ORDER — RACEPINEPHRINE HCL 2.25 %
1 SOLUTION, NON-ORAL TOPICAL ONCE
Refills: 0 | Status: DISCONTINUED | OUTPATIENT
Start: 2024-04-18 | End: 2024-04-18

## 2024-04-18 RX ORDER — BRIMONIDINE TARTRATE 2 MG/MG
0 SOLUTION/ DROPS OPHTHALMIC
Refills: 0 | DISCHARGE

## 2024-04-18 RX ORDER — ONDANSETRON 8 MG/1
4 TABLET, FILM COATED ORAL ONCE
Refills: 0 | Status: DISCONTINUED | OUTPATIENT
Start: 2024-04-18 | End: 2024-04-18

## 2024-04-18 RX ORDER — FENTANYL CITRATE 50 UG/ML
25 INJECTION INTRAVENOUS
Refills: 0 | Status: DISCONTINUED | OUTPATIENT
Start: 2024-04-18 | End: 2024-04-18

## 2024-04-18 RX ORDER — DORZOLAMIDE HYDROCHLORIDE TIMOLOL MALEATE 20; 5 MG/ML; MG/ML
0 SOLUTION/ DROPS OPHTHALMIC
Refills: 0 | DISCHARGE

## 2024-04-18 RX ORDER — SODIUM CHLORIDE 9 MG/ML
1000 INJECTION, SOLUTION INTRAVENOUS
Refills: 0 | Status: DISCONTINUED | OUTPATIENT
Start: 2024-04-18 | End: 2024-04-18

## 2024-04-18 NOTE — BRIEF OPERATIVE NOTE - NSICDXBRIEFPREOP_GEN_ALL_CORE_FT
PRE-OP DIAGNOSIS:  Ptosis, bilateral 18-Apr-2024 13:19:56  Manuelito Lopez  Steatoblepharon 18-Apr-2024 13:20:12  Manuelito Lopez

## 2024-04-18 NOTE — BRIEF OPERATIVE NOTE - OPERATION/FINDINGS
Bilateral UL ptosis repair, skin closed with nylon  Lower lid transconjunctival blepharoplasty Bilateral UL ptosis repair, skin closed with nylon  Lower lid trantranscutaneous blepharoplasty

## 2024-04-18 NOTE — BRIEF OPERATIVE NOTE - NSICDXBRIEFPOSTOP_GEN_ALL_CORE_FT
POST-OP DIAGNOSIS:  Ptosis, bilateral 18-Apr-2024 13:20:21  Manuelito Lopez  Steatoblepharon 18-Apr-2024 13:20:27  Manuelito Lopez

## 2024-04-18 NOTE — ASU DISCHARGE PLAN (ADULT/PEDIATRIC) - NS MD DC FALL RISK RISK
For information on Fall & Injury Prevention, visit: [email or web address] OR  [email or web address] OR  https://www.cdc.gov/steadi/patient.html

## 2024-04-18 NOTE — PRE-ANESTHESIA EVALUATION ADULT - NSANTHOSAYNRD_GEN_A_CORE
No. LEIGH screening performed.  STOP BANG Legend: 0-2 = LOW Risk; 3-4 = INTERMEDIATE Risk; 5-8 = HIGH Risk No

## 2024-04-18 NOTE — BRIEF OPERATIVE NOTE - NSICDXBRIEFPROCEDURE_GEN_ALL_CORE_FT
PROCEDURES:  Repair, ptosis, bilateral 18-Apr-2024 13:19:28  Manuelito Lopez  Blepharoplasty of both lower eyelids 18-Apr-2024 13:19:43  Manuelito Lopez

## 2024-05-11 LAB — SURGICAL PATHOLOGY STUDY: SIGNIFICANT CHANGE UP

## 2024-08-15 ENCOUNTER — HOSPITAL ENCOUNTER (OUTPATIENT)
Dept: RADIOLOGY | Facility: HOSPITAL | Age: 79
Discharge: HOME | End: 2024-08-15
Attending: INTERNAL MEDICINE
Payer: COMMERCIAL

## 2024-08-15 ENCOUNTER — OFFICE VISIT (OUTPATIENT)
Dept: INTERNAL MEDICINE | Facility: CLINIC | Age: 79
End: 2024-08-15
Payer: COMMERCIAL

## 2024-08-15 DIAGNOSIS — J06.9 UPPER RESPIRATORY TRACT INFECTION, UNSPECIFIED TYPE: ICD-10-CM

## 2024-08-15 DIAGNOSIS — B96.89 ACUTE BACTERIAL SINUSITIS: Primary | ICD-10-CM

## 2024-08-15 DIAGNOSIS — J01.90 ACUTE BACTERIAL SINUSITIS: Primary | ICD-10-CM

## 2024-08-15 DIAGNOSIS — R05.9 COUGH, UNSPECIFIED TYPE: ICD-10-CM

## 2024-08-15 DIAGNOSIS — R53.83 FATIGUE, UNSPECIFIED TYPE: ICD-10-CM

## 2024-08-15 LAB
CTP QC/QA: YES
CTP QC/QA: YES
POC MOLECULAR INFLUENZA A AGN: NEGATIVE
POC MOLECULAR INFLUENZA B AGN: NEGATIVE
SARS-COV-2 RDRP RESP QL NAA+PROBE: NEGATIVE

## 2024-08-15 PROCEDURE — 99999 PR PBB SHADOW E&M-EST. PATIENT-LVL III: CPT | Mod: PBBFAC,,, | Performed by: INTERNAL MEDICINE

## 2024-08-15 PROCEDURE — 71046 X-RAY EXAM CHEST 2 VIEWS: CPT | Mod: TC

## 2024-08-15 PROCEDURE — 71046 X-RAY EXAM CHEST 2 VIEWS: CPT | Mod: 26,,, | Performed by: RADIOLOGY

## 2024-08-15 RX ORDER — AMOXICILLIN AND CLAVULANATE POTASSIUM 875; 125 MG/1; MG/1
1 TABLET, FILM COATED ORAL 2 TIMES DAILY
Qty: 14 TABLET | Refills: 0 | Status: SHIPPED | OUTPATIENT
Start: 2024-08-15

## 2024-08-15 NOTE — Clinical Note
I take care of his wife, she asked me to put him into her appointment, we did offer to schedule with you but she wanted that particular time slot with me  LB

## 2024-08-15 NOTE — PROGRESS NOTES
Patient ID: Rip Anthony is a 79 y.o. male.    Chief Complaint: URI      Assessment:       1. Acute bacterial sinusitis    2. Upper respiratory tract infection, unspecified type    3. Cough, unspecified type    4. Fatigue, unspecified type          Plan:           1. Acute bacterial sinusitis  -     amoxicillin-clavulanate 875-125mg (AUGMENTIN) 875-125 mg per tablet; Take 1 tablet by mouth 2 (two) times daily.  Dispense: 14 tablet; Refill: 0    2. Upper respiratory tract infection, unspecified type  -     POCT COVID-19 Rapid Screening  -     POCT Influenza A/B Molecular    3. Cough, unspecified type  -     X-Ray Chest PA And Lateral; Future; Expected date: 08/15/2024    4. Fatigue, unspecified type  -     CBC Auto Differential; Future; Expected date: 08/15/2024  -     Comprehensive Metabolic Panel; Future; Expected date: 08/15/2024  -     TSH; Future; Expected date: 08/15/2024       Rest, fluids, acetaminophen, mucinex and follow up poor results.  I will review all studies and determine further tx depending on findings      Subjective:   Urgent care appointment; wife with him.    Asked by his wife to see him today.  PCP is listed as Dr. Leonardo although he has only seen him once for preop.  Used to see Dr. Parker here.    URI sx.    COVID several weeks ago about 6 weeks ago and got Paxlovid.  Did recover from it.    Usually exercises several times a week but since he had COVID this time has been more tired than usual.  Is not back to baseline.    Sinus and nose congestion.  Slight wheezing.  Phlegm is white but can be clear, sometimes yellow.  Has had these sx for about 2 weeks.      Has been traveling, in [place].    History of pneumonia in the past.  Never smoked.    Lots of sinus stuff in the family.    No GI sx.  No rash or joint sx.                Patient Active Problem List   Diagnosis    Mixed hyperlipidemia    History of colon polyps    Glaucoma        Review of Systems   Constitutional:  Positive for  fatigue. Negative for chills and fever.   HENT:  Positive for congestion, postnasal drip and sinus pressure. Negative for ear pain.    Eyes: Negative.    Respiratory:  Positive for cough. Negative for chest tightness, shortness of breath and wheezing.    Cardiovascular: Negative.    Gastrointestinal: Negative.          Objective:      Physical Exam  Constitutional:       Appearance: He is well-developed.   HENT:      Head: Normocephalic and atraumatic.      Comments: Hearing aids  Right TM dull     Right Ear: External ear normal.      Left Ear: Tympanic membrane and external ear normal.   Eyes:      Extraocular Movements: Extraocular movements intact.   Neck:      Thyroid: No thyromegaly.   Cardiovascular:      Rate and Rhythm: Normal rate and regular rhythm.   Pulmonary:      Effort: No respiratory distress.      Breath sounds: No wheezing or rales.   Abdominal:      General: Bowel sounds are normal. There is no distension.      Palpations: Abdomen is soft.      Tenderness: There is no abdominal tenderness.   Musculoskeletal:      Cervical back: Normal range of motion and neck supple.   Skin:     General: Skin is warm and dry.      Findings: No erythema or rash.   Neurological:      General: No focal deficit present.      Mental Status: He is alert and oriented to person, place, and time. Mental status is at baseline.   Psychiatric:         Mood and Affect: Mood normal.         Behavior: Behavior normal.         Thought Content: Thought content normal.             Health Maintenance Due   Topic Date Due    RSV Vaccine (Age 60+ and Pregnant patients) (1 - 1-dose 60+ series) Never done    COVID-19 Vaccine (7 - 2023-24 season) 05/09/2024    Colonoscopy  06/04/2024

## 2024-08-22 ENCOUNTER — TELEPHONE (OUTPATIENT)
Dept: INTERNAL MEDICINE | Facility: CLINIC | Age: 79
End: 2024-08-22
Payer: COMMERCIAL

## 2024-11-05 ENCOUNTER — IMMUNIZATION (OUTPATIENT)
Dept: INTERNAL MEDICINE | Facility: CLINIC | Age: 79
End: 2024-11-05
Payer: COMMERCIAL

## 2024-11-05 DIAGNOSIS — Z23 NEED FOR VACCINATION: Primary | ICD-10-CM

## 2024-11-05 PROCEDURE — 90480 ADMN SARSCOV2 VAC 1/ONLY CMP: CPT | Mod: S$GLB,,, | Performed by: INTERNAL MEDICINE

## 2024-11-05 PROCEDURE — 91320 SARSCV2 VAC 30MCG TRS-SUC IM: CPT | Mod: S$GLB,,, | Performed by: INTERNAL MEDICINE

## 2025-03-11 ENCOUNTER — OFFICE VISIT (OUTPATIENT)
Dept: INTERNAL MEDICINE | Facility: CLINIC | Age: 80
End: 2025-03-11
Payer: COMMERCIAL

## 2025-03-11 VITALS
OXYGEN SATURATION: 100 % | BODY MASS INDEX: 24.95 KG/M2 | DIASTOLIC BLOOD PRESSURE: 80 MMHG | HEART RATE: 55 BPM | SYSTOLIC BLOOD PRESSURE: 130 MMHG | WEIGHT: 188.25 LBS | HEIGHT: 73 IN

## 2025-03-11 DIAGNOSIS — J32.9 SINUSITIS, UNSPECIFIED CHRONICITY, UNSPECIFIED LOCATION: ICD-10-CM

## 2025-03-11 DIAGNOSIS — H92.01 OTALGIA OF RIGHT EAR: Primary | ICD-10-CM

## 2025-03-11 PROCEDURE — 99214 OFFICE O/P EST MOD 30 MIN: CPT | Mod: S$GLB,,, | Performed by: FAMILY MEDICINE

## 2025-03-11 PROCEDURE — 3075F SYST BP GE 130 - 139MM HG: CPT | Mod: CPTII,S$GLB,, | Performed by: FAMILY MEDICINE

## 2025-03-11 PROCEDURE — 99999 PR PBB SHADOW E&M-EST. PATIENT-LVL IV: CPT | Mod: PBBFAC,,, | Performed by: FAMILY MEDICINE

## 2025-03-11 PROCEDURE — 3288F FALL RISK ASSESSMENT DOCD: CPT | Mod: CPTII,S$GLB,, | Performed by: FAMILY MEDICINE

## 2025-03-11 PROCEDURE — 1126F AMNT PAIN NOTED NONE PRSNT: CPT | Mod: CPTII,S$GLB,, | Performed by: FAMILY MEDICINE

## 2025-03-11 PROCEDURE — 1101F PT FALLS ASSESS-DOCD LE1/YR: CPT | Mod: CPTII,S$GLB,, | Performed by: FAMILY MEDICINE

## 2025-03-11 PROCEDURE — 3079F DIAST BP 80-89 MM HG: CPT | Mod: CPTII,S$GLB,, | Performed by: FAMILY MEDICINE

## 2025-03-11 PROCEDURE — 1159F MED LIST DOCD IN RCRD: CPT | Mod: CPTII,S$GLB,, | Performed by: FAMILY MEDICINE

## 2025-03-11 PROCEDURE — G2211 COMPLEX E/M VISIT ADD ON: HCPCS | Mod: S$GLB,,, | Performed by: FAMILY MEDICINE

## 2025-03-11 NOTE — PROGRESS NOTES
Subjective:       Patient ID: Rip Anthony is a 79 y.o. male.    Chief Complaint: Follow-up    HPI    Rip Anthony is a 79 y.o. male for prob visit.     Right ear pain, hearing changes, cough/congestion, sinus pressure  No sore throat, no fever/chills  Episode of fever and night sweats around 10d ago.  Feels a little better today compared to yesterday     #Cards: HLD  - reg: Lipitor 20 qd; ASA qd     #Ophtho: Glaucoma    #Uro:  - est w/ Dr. Mendoza ([place]),  12/2024    Review of Systems   Constitutional:  Negative for chills and fever.   HENT:  Positive for congestion, ear pain, postnasal drip and sinus pressure. Negative for ear discharge, sore throat and trouble swallowing.    Respiratory:  Positive for cough.    Cardiovascular:  Negative for chest pain.   Gastrointestinal:  Negative for abdominal pain.         Past Medical History:   Diagnosis Date    Mixed hyperlipidemia 5/4/2017    Retina disorder         Prior to Admission medications    Medication Sig Start Date End Date Taking? Authorizing Provider   amoxicillin-clavulanate 875-125mg (AUGMENTIN) 875-125 mg per tablet Take 1 tablet by mouth 2 (two) times daily. 8/15/24   Zena Delarosa MD   aspirin (ECOTRIN) 81 MG EC tablet Take by mouth. 12/14/11   Provider, Historical   atorvastatin (LIPITOR) 20 MG tablet Take 1 tablet (20 mg total) by mouth once daily. 3/28/24   Rohit Leonardo MD   brimonidine 0.1% (ALPHAGAN P) 0.1 % Drop Inject into the eye. 12/14/11   Provider, Historical   brimonidine 0.1% (ALPHAGAN P) 0.1 % Drop instill 1 drop into both eyes three times a day 7/24/24      brimonidine 0.1% (ALPHAGAN P) 0.1 % Drop Place 1 drop into both eyes 3 times a day 1/27/25      ciprofloxacin HCl (CIPRO) 500 MG tablet Take 1 tablet (500 mg total) by mouth 2 (two) times daily. 6/24/21   Davide Parker MD   COSOPT, PF, 2-0.5 % Dpet ophthalmic solution Place 1 drop into both eyes 2 (two) times daily. 5/28/19   Provider, Historical   dorzolamide-timolol  "2-0.5% (COSOPT) 22.3-6.8 mg/mL ophthalmic solution Apply 1 drop into both eyes twice a day 10/22/24      dorzolamide-timolol 2-0.5% (COSOPT) 22.3-6.8 mg/mL ophthalmic solution Apply 1 drop into both eyes twice a day 10/24/24      dorzolamide-timolol 2-0.5% (COSOPT) 22.3-6.8 mg/mL ophthalmic solution Apply 1 drop into both eyes twice a day 1/10/25      dorzolamide-timolol, PF, (COSOPT, PF,) 2-0.5 % Dpet ophthalmic solution instill 1 drop in both eyes twice daily 1/27/25      dorzolamide-timolol, PF, (COSOPT PF) 2-0.5 % Dpet ophthalmic solution Instill 1 drop into each eye twice daily 11/28/22   Enrique Messer MD   dorzolamide-timolol, PF, (COSOPT, PF,) 2-0.5 % Dpet ophthalmic solution Apply 1 drop Both Eyes twice a day 7/10/23      dorzolamide-timolol, PF, (COSOPT, PF,) 2-0.5 % Dpet ophthalmic solution Instill ONE DROP IN EACH EYE TWICE DAILY 10/2/23      dorzolamide-timolol, PF, (COSOPT, PF,) 2-0.5 % Dpet ophthalmic solution INSTILL ONE DROP IN EACH EYE TWICE DAILY 1/10/25      dorzolamide-timolol, PF, (COSOPT, PF,) 2-0.5 % Dpet ophthalmic solution Apply 1 drop into both eyes twice a day instill ONE DROP IN EACH EYE TWICE DAILY 1/28/25      pneumoc 20-lois conj-dip cr,PF, (PREVNAR-20, PF,) 0.5 mL Syrg injection Inject into the muscle. 3/28/24   Jennifer Nogueira, PharmD        Past medical history, surgical history, and family medical history reviewed and updated as appropriate.    Medications and allergies reviewed.     Objective:          Vitals:    03/11/25 0956   BP: 130/80   BP Location: Left arm   Patient Position: Sitting   Pulse: (!) 55   SpO2: 100%   Weight: 85.4 kg (188 lb 4.4 oz)   Height: 6' 1" (1.854 m)     Body mass index is 24.84 kg/m².  Physical Exam  Vitals and nursing note reviewed.   Constitutional:       General: He is not in acute distress.     Appearance: Normal appearance. He is well-developed.   HENT:      Right Ear: Tympanic membrane, ear canal and external ear normal. There is no impacted " cerumen.      Left Ear: Tympanic membrane, ear canal and external ear normal. There is no impacted cerumen.      Nose: Congestion present. No rhinorrhea.      Mouth/Throat:      Pharynx: No oropharyngeal exudate or posterior oropharyngeal erythema.   Eyes:      Extraocular Movements: Extraocular movements intact.   Cardiovascular:      Rate and Rhythm: Normal rate.   Pulmonary:      Effort: Pulmonary effort is normal. No respiratory distress.      Breath sounds: Normal breath sounds. No wheezing.   Neurological:      Mental Status: He is alert and oriented to person, place, and time.   Psychiatric:         Mood and Affect: Mood normal.         Behavior: Behavior normal.         Lab Results   Component Value Date    WBC 7.41 08/15/2024    HGB 14.4 08/15/2024    HCT 44.3 08/15/2024     08/15/2024    CHOL 167 03/28/2024    TRIG 63 03/28/2024    HDL 61 03/28/2024    ALT 15 08/15/2024    AST 17 08/15/2024     08/15/2024    K 4.5 08/15/2024     08/15/2024    CREATININE 0.8 08/15/2024    BUN 10 08/15/2024    CO2 25 08/15/2024    TSH 1.249 08/15/2024    PSA 5.9 (H) 03/28/2024    INR 1.0 03/28/2024    HGBA1C 5.3 03/28/2024       Assessment:       1. Otalgia of right ear    2. Sinusitis, unspecified chronicity, unspecified location          Plan:   1. Otalgia of right ear    2. Sinusitis, unspecified chronicity, unspecified location        Try otc options first for symptoms, keep in touch if symptoms persist or worsen again    Follow up should symptoms persist or worsen. If symptoms become severe, please report immediately to urgent care or emergency room for further evaluation. Patient voiced understanding.      No follow-ups on file.    As this patient's primary care physician, I am actively managing and/or treating his/her chronic medical conditions now and in the future. This includes, but is not limited to, medication management, coordination of care, documentation review from his/her specialists, and  labs/imaging review that I have performed to prepare for this visit as well as will do so in the future as part of my care continuity for this patient.    Rohit Leonardo MD  Family Medicine / Primary Care  [facility]  3/11/2025

## 2025-07-30 DIAGNOSIS — E78.2 MIXED HYPERLIPIDEMIA: ICD-10-CM

## 2025-07-30 NOTE — TELEPHONE ENCOUNTER
Care Due:                  Date            Visit Type   Department     Provider  --------------------------------------------------------------------------------                                EP -                              PRIMARY      [facility]  Last Visit: 03-      CARE ([facility])   MEDICINE       Rohit Leonardo  Next Visit: None Scheduled  None         None Found                                                            Last  Test          Frequency    Reason                     Performed    Due Date  --------------------------------------------------------------------------------    CMP.........  12 months..  atorvastatin.............  08-   08-    Lipid Panel.  12 months..  atorvastatin.............  03- 03-    [facility] Embedded Care Due Messages. Reference number: [id number].   7/30/2025 1:08:38 PM CDT

## 2025-07-31 RX ORDER — ATORVASTATIN CALCIUM 20 MG/1
20 TABLET, FILM COATED ORAL DAILY
Qty: 90 TABLET | Refills: 0 | Status: SHIPPED | OUTPATIENT
Start: 2025-07-31

## 2025-07-31 NOTE — TELEPHONE ENCOUNTER
Refill Routing Note   Medication(s) are not appropriate for processing by [facility] for the following reason(s):        Required labs outdated    [facility] action(s):  Defer   Requires labs : Yes             Appointments  past 12m or future 3m with PCP    Date Provider   Last Visit   3/11/2025 Rohit Leonardo MD   Next Visit   Visit date not found Rohit Leonardo MD   ED visits in past 90 days: 0        Note composed:8:05 PM 07/30/2025

## (undated) DEVICE — KNIFE ALCON STANDARD FULL HANDLE 30 DEG (PINK)

## (undated) DEVICE — SUT ETHILON 6-0 18" P-1 UNDYED

## (undated) DEVICE — SPEAR SURG EYE WECK-CELL CELOS

## (undated) DEVICE — APPLICATOR COTTON TIP 6"

## (undated) DEVICE — GOWN ROYAL SILK XL

## (undated) DEVICE — SUT CHROMIC 6-0 18" G-1

## (undated) DEVICE — SUT CHROMIC 5-0 18" PS-3

## (undated) DEVICE — PACK BLEPHAROPLASTY

## (undated) DEVICE — SUT SILK 6-0 18" G-1

## (undated) DEVICE — GLV 8 PROTEXIS (WHITE)

## (undated) DEVICE — WARMING BLANKET UPPER ADULT

## (undated) DEVICE — SYR CONTROL LUER LOK 10CC

## (undated) DEVICE — ELCTR COLORADO 3CM